# Patient Record
Sex: MALE | Race: BLACK OR AFRICAN AMERICAN | Employment: FULL TIME | URBAN - METROPOLITAN AREA
[De-identification: names, ages, dates, MRNs, and addresses within clinical notes are randomized per-mention and may not be internally consistent; named-entity substitution may affect disease eponyms.]

---

## 2019-11-05 ENCOUNTER — APPOINTMENT (OUTPATIENT)
Dept: MRI IMAGING | Age: 49
DRG: 182 | End: 2019-11-05
Attending: EMERGENCY MEDICINE
Payer: MEDICAID

## 2019-11-05 ENCOUNTER — APPOINTMENT (OUTPATIENT)
Dept: CT IMAGING | Age: 49
DRG: 182 | End: 2019-11-05
Attending: EMERGENCY MEDICINE
Payer: MEDICAID

## 2019-11-05 ENCOUNTER — HOSPITAL ENCOUNTER (INPATIENT)
Age: 49
LOS: 3 days | Discharge: HOME | DRG: 182 | End: 2019-11-09
Attending: EMERGENCY MEDICINE | Admitting: SURGERY
Payer: MEDICAID

## 2019-11-05 DIAGNOSIS — M62.82 NON-TRAUMATIC RHABDOMYOLYSIS: ICD-10-CM

## 2019-11-05 DIAGNOSIS — I77.1: ICD-10-CM

## 2019-11-05 DIAGNOSIS — I10 HYPERTENSION, ACCELERATED: ICD-10-CM

## 2019-11-05 DIAGNOSIS — G89.18 POST-OP PAIN: ICD-10-CM

## 2019-11-05 DIAGNOSIS — S44.92XA NEURAPRAXIA OF LEFT UPPER EXTREMITY, INITIAL ENCOUNTER: Primary | ICD-10-CM

## 2019-11-05 LAB
ALBUMIN SERPL-MCNC: 4 G/DL (ref 3.4–5)
ALBUMIN/GLOB SERPL: 1 {RATIO} (ref 0.8–1.7)
ALP SERPL-CCNC: 102 U/L (ref 45–117)
ALT SERPL-CCNC: 66 U/L (ref 16–61)
ANION GAP SERPL CALC-SCNC: 7 MMOL/L (ref 3–18)
AST SERPL-CCNC: 176 U/L (ref 10–38)
BASOPHILS # BLD: 0 K/UL (ref 0–0.1)
BASOPHILS NFR BLD: 0 % (ref 0–2)
BILIRUB SERPL-MCNC: 0.7 MG/DL (ref 0.2–1)
BUN SERPL-MCNC: 12 MG/DL (ref 7–18)
BUN/CREAT SERPL: 11 (ref 12–20)
CALCIUM SERPL-MCNC: 9.4 MG/DL (ref 8.5–10.1)
CHLORIDE SERPL-SCNC: 103 MMOL/L (ref 100–111)
CK MB CFR SERPL CALC: 2.1 % (ref 0–4)
CK MB SERPL-MCNC: 116.4 NG/ML (ref 5–25)
CK SERPL-CCNC: 5600 U/L (ref 39–308)
CO2 SERPL-SCNC: 29 MMOL/L (ref 21–32)
CREAT SERPL-MCNC: 1.07 MG/DL (ref 0.6–1.3)
DIFFERENTIAL METHOD BLD: ABNORMAL
EOSINOPHIL # BLD: 0 K/UL (ref 0–0.4)
EOSINOPHIL NFR BLD: 0 % (ref 0–5)
ERYTHROCYTE [DISTWIDTH] IN BLOOD BY AUTOMATED COUNT: 14.2 % (ref 11.6–14.5)
ETHANOL SERPL-MCNC: <3 MG/DL (ref 0–3)
GLOBULIN SER CALC-MCNC: 4.2 G/DL (ref 2–4)
GLUCOSE SERPL-MCNC: 113 MG/DL (ref 74–99)
HCT VFR BLD AUTO: 45 % (ref 36–48)
HGB BLD-MCNC: 15.1 G/DL (ref 13–16)
LYMPHOCYTES # BLD: 1.2 K/UL (ref 0.9–3.6)
LYMPHOCYTES NFR BLD: 12 % (ref 21–52)
MCH RBC QN AUTO: 31.7 PG (ref 24–34)
MCHC RBC AUTO-ENTMCNC: 33.6 G/DL (ref 31–37)
MCV RBC AUTO: 94.3 FL (ref 74–97)
MONOCYTES # BLD: 1 K/UL (ref 0.05–1.2)
MONOCYTES NFR BLD: 10 % (ref 3–10)
NEUTS SEG # BLD: 7.6 K/UL (ref 1.8–8)
NEUTS SEG NFR BLD: 78 % (ref 40–73)
PLATELET # BLD AUTO: 195 K/UL (ref 135–420)
PMV BLD AUTO: 10.1 FL (ref 9.2–11.8)
POTASSIUM SERPL-SCNC: 4 MMOL/L (ref 3.5–5.5)
PROT SERPL-MCNC: 8.2 G/DL (ref 6.4–8.2)
RBC # BLD AUTO: 4.77 M/UL (ref 4.7–5.5)
SODIUM SERPL-SCNC: 139 MMOL/L (ref 136–145)
TROPONIN I SERPL-MCNC: 0.85 NG/ML (ref 0–0.04)
WBC # BLD AUTO: 9.8 K/UL (ref 4.6–13.2)

## 2019-11-05 PROCEDURE — 93005 ELECTROCARDIOGRAM TRACING: CPT

## 2019-11-05 PROCEDURE — 85025 COMPLETE CBC W/AUTO DIFF WBC: CPT

## 2019-11-05 PROCEDURE — 99285 EMERGENCY DEPT VISIT HI MDM: CPT

## 2019-11-05 PROCEDURE — 74011250636 HC RX REV CODE- 250/636: Performed by: EMERGENCY MEDICINE

## 2019-11-05 PROCEDURE — 96374 THER/PROPH/DIAG INJ IV PUSH: CPT

## 2019-11-05 PROCEDURE — 74011636320 HC RX REV CODE- 636/320: Performed by: EMERGENCY MEDICINE

## 2019-11-05 PROCEDURE — 70450 CT HEAD/BRAIN W/O DYE: CPT

## 2019-11-05 PROCEDURE — 74011250637 HC RX REV CODE- 250/637: Performed by: EMERGENCY MEDICINE

## 2019-11-05 PROCEDURE — 82550 ASSAY OF CK (CPK): CPT

## 2019-11-05 PROCEDURE — 80053 COMPREHEN METABOLIC PANEL: CPT

## 2019-11-05 PROCEDURE — 85730 THROMBOPLASTIN TIME PARTIAL: CPT

## 2019-11-05 PROCEDURE — 80307 DRUG TEST PRSMV CHEM ANLYZR: CPT

## 2019-11-05 PROCEDURE — 74011000258 HC RX REV CODE- 258: Performed by: EMERGENCY MEDICINE

## 2019-11-05 PROCEDURE — 73206 CT ANGIO UPR EXTRM W/O&W/DYE: CPT

## 2019-11-05 RX ORDER — ALBUTEROL SULFATE 90 UG/1
2 AEROSOL, METERED RESPIRATORY (INHALATION)
COMMUNITY
End: 2020-06-30

## 2019-11-05 RX ORDER — SODIUM CHLORIDE 9 MG/ML
100 INJECTION, SOLUTION INTRAVENOUS ONCE
Status: COMPLETED | OUTPATIENT
Start: 2019-11-05 | End: 2019-11-05

## 2019-11-05 RX ORDER — HEPARIN SODIUM 1000 [USP'U]/ML
80 INJECTION, SOLUTION INTRAVENOUS; SUBCUTANEOUS ONCE
Status: COMPLETED | OUTPATIENT
Start: 2019-11-05 | End: 2019-11-05

## 2019-11-05 RX ORDER — GUAIFENESIN 100 MG/5ML
324 LIQUID (ML) ORAL
Status: COMPLETED | OUTPATIENT
Start: 2019-11-05 | End: 2019-11-05

## 2019-11-05 RX ORDER — HEPARIN SODIUM 10000 [USP'U]/100ML
18-36 INJECTION, SOLUTION INTRAVENOUS
Status: DISCONTINUED | OUTPATIENT
Start: 2019-11-05 | End: 2019-11-06

## 2019-11-05 RX ORDER — SODIUM CHLORIDE 9 MG/ML
150 INJECTION, SOLUTION INTRAVENOUS CONTINUOUS
Status: DISCONTINUED | OUTPATIENT
Start: 2019-11-05 | End: 2019-11-06

## 2019-11-05 RX ORDER — AMLODIPINE BESYLATE 5 MG/1
10 TABLET ORAL
Status: COMPLETED | OUTPATIENT
Start: 2019-11-05 | End: 2019-11-05

## 2019-11-05 RX ADMIN — NITROGLYCERIN 1 INCH: 20 OINTMENT TOPICAL at 21:23

## 2019-11-05 RX ADMIN — SODIUM CHLORIDE 1000 ML: 900 INJECTION, SOLUTION INTRAVENOUS at 19:20

## 2019-11-05 RX ADMIN — HEPARIN SODIUM 5440 UNITS: 1000 INJECTION INTRAVENOUS; SUBCUTANEOUS at 23:22

## 2019-11-05 RX ADMIN — IOPAMIDOL 80 ML: 755 INJECTION, SOLUTION INTRAVENOUS at 19:48

## 2019-11-05 RX ADMIN — ASPIRIN 81 MG 324 MG: 81 TABLET ORAL at 19:19

## 2019-11-05 RX ADMIN — AMLODIPINE BESYLATE 10 MG: 5 TABLET ORAL at 21:23

## 2019-11-05 RX ADMIN — SODIUM CHLORIDE 96 ML: 900 INJECTION, SOLUTION INTRAVENOUS at 19:49

## 2019-11-05 RX ADMIN — HEPARIN SODIUM 18 UNITS/KG/HR: 10000 INJECTION, SOLUTION INTRAVENOUS at 23:26

## 2019-11-05 NOTE — ED PROVIDER NOTES
Kavya Burns is a 52 y.o. Male with prior history of hypertension who states he woke up after falling asleep in the chair this morning around 10:00 with inability to move his left arm. Patient also has numbness and tingling that is mostly below the elbow. He is unable to  anything. Is able to bend his elbow. Patient can lift his shoulders. He denies any pain. He had no prior headache. There is no recent trauma. He had no neck pain. He is never had anything like this happen before. He came in because his symptoms are not getting better. Patient denies any visual changes, difficulty swallowing difficulty with speech difficulty with gait any chest pain or shortness of breath. The history is provided by the patient. No past medical history on file. No past surgical history on file. No family history on file. Social History     Socioeconomic History    Marital status: SINGLE     Spouse name: Not on file    Number of children: Not on file    Years of education: Not on file    Highest education level: Not on file   Occupational History    Not on file   Social Needs    Financial resource strain: Not on file    Food insecurity:     Worry: Not on file     Inability: Not on file    Transportation needs:     Medical: Not on file     Non-medical: Not on file   Tobacco Use    Smoking status: Current Every Day Smoker     Years: 20.00   Substance and Sexual Activity    Alcohol use:  Yes    Drug use: Not on file    Sexual activity: Not on file   Lifestyle    Physical activity:     Days per week: Not on file     Minutes per session: Not on file    Stress: Not on file   Relationships    Social connections:     Talks on phone: Not on file     Gets together: Not on file     Attends Sabianism service: Not on file     Active member of club or organization: Not on file     Attends meetings of clubs or organizations: Not on file     Relationship status: Not on file    Intimate partner violence:     Fear of current or ex partner: Not on file     Emotionally abused: Not on file     Physically abused: Not on file     Forced sexual activity: Not on file   Other Topics Concern    Not on file   Social History Narrative    Not on file         ALLERGIES: Shellfish derived    Review of Systems   Constitutional: Negative for fever. HENT: Negative for sore throat. Eyes: Negative for visual disturbance. Respiratory: Negative for shortness of breath. Cardiovascular: Negative for chest pain. Gastrointestinal: Negative for abdominal pain. Genitourinary: Negative for difficulty urinating. Musculoskeletal: Negative for gait problem, neck pain and neck stiffness. Skin: Negative for rash and wound. Neurological: Positive for weakness and numbness. Negative for dizziness, speech difficulty and headaches. Psychiatric/Behavioral: Negative for sleep disturbance. Vitals:    11/05/19 2123 11/05/19 2145 11/05/19 2200 11/05/19 2215   BP: (!) 184/130 (!) 168/122 (!) 176/119 (!) 170/116   Pulse: 87 77 78 77   Resp:  11 15 17   Temp:       SpO2:  100% 100% 100%   Weight:       Height:                Physical Exam   Constitutional: He is oriented to person, place, and time. He appears well-developed and well-nourished. Non-toxic appearance. He does not have a sickly appearance. He does not appear ill. No distress. HENT:   Head: Normocephalic and atraumatic. Right Ear: External ear normal.   Left Ear: External ear normal.   Nose: Nose normal.   Mouth/Throat: Oropharynx is clear and moist. No oropharyngeal exudate. Eyes: Conjunctivae are normal.   Neck: Normal range of motion. Cardiovascular: Normal rate, regular rhythm, normal heart sounds and intact distal pulses. Pulmonary/Chest: Effort normal and breath sounds normal. No respiratory distress. Abdominal: Soft. There is no tenderness. Musculoskeletal:   Unable to flex or extend at left elbow.   Also able to flex or extend left wrist. Unable to move fingers. Patient's left upper extremity is cool below the left elbow. His radial pulse is present. And his cap refill is significantly delayed. Neurological: He is alert and oriented to person, place, and time. A sensory deficit is present. No cranial nerve deficit (3-12). He exhibits abnormal muscle tone. GCS eye subscore is 4. GCS verbal subscore is 5. GCS motor subscore is 6. Patient appears to have normal tone of his left bicep and is able to lift his shoulder upwards and has normal tone and strength of his trapezial area. Patient is unable to flex or extend his elbow. He has complete wrist drop as well. He has decreased sensation below the elbow. Appears his sensation is intact above the elbow. There is normal distal pulses in his upper extremities and lower extremities. Skin: Skin is warm and dry. He is not diaphoretic. Psychiatric: His behavior is normal.   Nursing note and vitals reviewed.        MDM       Procedures  Vitals:  Patient Vitals for the past 12 hrs:   Temp Pulse Resp BP SpO2   11/05/19 2215  77 17 (!) 170/116 100 %   11/05/19 2200  78 15 (!) 176/119 100 %   11/05/19 2145  77 11 (!) 168/122 100 %   11/05/19 2123  87  (!) 184/130    11/05/19 2045  78 19 (!) 176/118 100 %   11/05/19 2030  82 18 (!) 175/126 100 %   11/05/19 2015  82 15 (!) 190/114 100 %   11/05/19 1915  85 17 (!) 174/116 100 %   11/05/19 1900  82 15  100 %   11/05/19 1845  89 19  100 %   11/05/19 1837    (!) 200/129    11/05/19 1833 98.3 °F (36.8 °C) 96  (!) 202/119 100 %   11/05/19 1830    (!) 186/124 100 %         Medications ordered:   Medications   0.9% sodium chloride infusion (has no administration in time range)   heparin 25,000 units in D5W 250 ml infusion (has no administration in time range)   sodium chloride 0.9 % bolus infusion 1,000 mL (0 mL IntraVENous IV Completed 11/5/19 2127)   aspirin chewable tablet 324 mg (324 mg Oral Given 11/5/19 1919)   iopamidol (ISOVUE-370) 76 % injection 100 mL (80 mL IntraVENous Given 11/5/19 1948)   0.9% sodium chloride infusion 100 mL (0 mL IntraVENous IV Completed 11/5/19 1950)   amLODIPine (NORVASC) tablet 10 mg (10 mg Oral Given 11/5/19 2123)   nitroglycerin (NITROBID) 2 % ointment 1 Inch (1 Inch Topical Given 11/5/19 2123)   heparin (porcine) 1,000 unit/mL injection 5,440 Units (5,440 Units IntraVENous Given 11/5/19 2322)         Lab findings:  Recent Results (from the past 12 hour(s))   CBC WITH AUTOMATED DIFF    Collection Time: 11/05/19  6:49 PM   Result Value Ref Range    WBC 9.8 4.6 - 13.2 K/uL    RBC 4.77 4.70 - 5.50 M/uL    HGB 15.1 13.0 - 16.0 g/dL    HCT 45.0 36.0 - 48.0 %    MCV 94.3 74.0 - 97.0 FL    MCH 31.7 24.0 - 34.0 PG    MCHC 33.6 31.0 - 37.0 g/dL    RDW 14.2 11.6 - 14.5 %    PLATELET 996 684 - 943 K/uL    MPV 10.1 9.2 - 11.8 FL    NEUTROPHILS 78 (H) 40 - 73 %    LYMPHOCYTES 12 (L) 21 - 52 %    MONOCYTES 10 3 - 10 %    EOSINOPHILS 0 0 - 5 %    BASOPHILS 0 0 - 2 %    ABS. NEUTROPHILS 7.6 1.8 - 8.0 K/UL    ABS. LYMPHOCYTES 1.2 0.9 - 3.6 K/UL    ABS. MONOCYTES 1.0 0.05 - 1.2 K/UL    ABS. EOSINOPHILS 0.0 0.0 - 0.4 K/UL    ABS. BASOPHILS 0.0 0.0 - 0.1 K/UL    DF AUTOMATED     METABOLIC PANEL, COMPREHENSIVE    Collection Time: 11/05/19  6:49 PM   Result Value Ref Range    Sodium 139 136 - 145 mmol/L    Potassium 4.0 3.5 - 5.5 mmol/L    Chloride 103 100 - 111 mmol/L    CO2 29 21 - 32 mmol/L    Anion gap 7 3.0 - 18 mmol/L    Glucose 113 (H) 74 - 99 mg/dL    BUN 12 7.0 - 18 MG/DL    Creatinine 1.07 0.6 - 1.3 MG/DL    BUN/Creatinine ratio 11 (L) 12 - 20      GFR est AA >60 >60 ml/min/1.73m2    GFR est non-AA >60 >60 ml/min/1.73m2    Calcium 9.4 8.5 - 10.1 MG/DL    Bilirubin, total 0.7 0.2 - 1.0 MG/DL    ALT (SGPT) 66 (H) 16 - 61 U/L    AST (SGOT) 176 (H) 10 - 38 U/L    Alk.  phosphatase 102 45 - 117 U/L    Protein, total 8.2 6.4 - 8.2 g/dL    Albumin 4.0 3.4 - 5.0 g/dL    Globulin 4.2 (H) 2.0 - 4.0 g/dL    A-G Ratio 1.0 0.8 - 1. 7     ETHYL ALCOHOL    Collection Time: 11/05/19  6:49 PM   Result Value Ref Range    ALCOHOL(ETHYL),SERUM <3 0 - 3 MG/DL   CARDIAC PANEL,(CK, CKMB & TROPONIN)    Collection Time: 11/05/19  6:49 PM   Result Value Ref Range    CK 5,600 (H) 39 - 308 U/L    CK - .4 (H) <3.6 ng/ml    CK-MB Index 2.1 0.0 - 4.0 %    Troponin-I, QT 0.85 (H) 0.0 - 0.045 NG/ML   EKG, 12 LEAD, INITIAL    Collection Time: 11/05/19 11:23 PM   Result Value Ref Range    Ventricular Rate 82 BPM    Atrial Rate 82 BPM    P-R Interval 180 ms    QRS Duration 90 ms    Q-T Interval 414 ms    QTC Calculation (Bezet) 483 ms    Calculated P Axis 30 degrees    Calculated R Axis 30 degrees    Calculated T Axis -175 degrees    Diagnosis       Normal sinus rhythm  Left ventricular hypertrophy with repolarization abnormality ( R in aVL ,   Eloy product , Romhilt-Augustin )  Possible Lateral infarct , age undetermined  Abnormal ECG  No previous ECGs available         EKG interpretation by ED Physician:  Normal sinus rhythm with LVH. No acute ST changes. There is T wave inversions. Rate 82 QRS 90   No previous EKG    Current monitor: Normal rate with regular rhythm and no ectopy  Pulse ox: 100% room air      X-Ray, CT or other radiology findings or impressions:  CT HEAD WO CONT    (Results Pending)   CTA UP EXT LT W CONT    (Results Pending)   MRI SHOULDER LT W WO CONT    (Results Pending)   CT head with no acute process per preliminary report  CTA upper extremity left: Patent but significantly diminished vascular flow distal to the elbow    Progress notes, Consult notes or additional Procedure notes:   Recheck of left upper extremity with still coolness noted but pulse present. Cap refill is delayed. Patient still unable to move elbow or distal joints.     10:01 PM  Discussed with Dr. Halina Rosenberg on-call for vascular who come evaluate the patient    10:41 PM  D/w dr Fabi Ashley on call for ortho who rec mri brachial plexus    D/w patient regarding plan for MRI tonight and that further delay in investigation of his symptoms could result in permanent disability and complete loss of function of his left arm. Patient had wanted to come back tomorrow but I told him that this is not recommended as he could lose permanent function    Patient was seen by Dr. Singh Led at bedside who recommends patient go to the OR for thrombectomy. Dr. Vickie Kern made aware patient was going to be admitted to go to the operating room and MRI would still be done tonight. ED Critical Care Note    System at risk for life threatening failure: Cardiac, pulmonary, renal  Associated problems: Rhabdo, elevated troponin, arterial occlusion    Critical Care services provided: Bedside management of neuropraxia, arterial occlusion, rhabdo, documentation, consultation, bedside reassessment  Excluded procedures (time not included in critical care): EKG interpretation    Total Critical Care Time (in minutes) 94            Reevaluation of patient:   stable    Disposition:  Diagnosis:   1. Neurapraxia of left upper extremity, initial encounter    2. Extrinsic arterial compression (HCC)    3. Hypertension, accelerated    4. Non-traumatic rhabdomyolysis        Disposition: admit    Follow-up Information    None           Patient's Medications   Start Taking    No medications on file   Continue Taking    ALBUTEROL (PROVENTIL HFA, VENTOLIN HFA, PROAIR HFA) 90 MCG/ACTUATION INHALER    Take 2 Puffs by inhalation every four (4) hours as needed for Wheezing.     TRAMADOL (ULTRAM) 50 MG TABLET    Sig: Take 1-2 tablets every 6 hours PRN severe pain   These Medications have changed    No medications on file   Stop Taking    No medications on file

## 2019-11-05 NOTE — ED NOTES
Charge nurse phoned prior to rolling patient to back for assessment. Physician requested patient be taken to room 4. Last seen alert, verbal, and without pain. Significant other present at bedside. No vitals were done as nursing assessment was found to be in need of physician assessment.

## 2019-11-05 NOTE — ED TRIAGE NOTES
Pt presents with c/o left arm numbness/ weakness since this AM. Did fall off couch during sleep last night, but denies injury.

## 2019-11-05 NOTE — ED TRIAGE NOTES
Patient stated \"I can't move my left arm since I woke up at 1000 this morning. I got up from the floor this morning. I can't move the fingers or anything\". Arm is unable to be controlled by patient. Fell to leg when lifted by this nurse. Wheeled to back for physician assessment. Patient is ambulatory and has control of bilateral lower extremities and right upper extremity.

## 2019-11-06 ENCOUNTER — ANESTHESIA (OUTPATIENT)
Dept: SURGERY | Age: 49
DRG: 182 | End: 2019-11-06
Payer: MEDICAID

## 2019-11-06 ENCOUNTER — ANESTHESIA EVENT (OUTPATIENT)
Dept: SURGERY | Age: 49
DRG: 182 | End: 2019-11-06
Payer: MEDICAID

## 2019-11-06 ENCOUNTER — APPOINTMENT (OUTPATIENT)
Dept: GENERAL RADIOLOGY | Age: 49
DRG: 182 | End: 2019-11-06
Attending: SURGERY
Payer: MEDICAID

## 2019-11-06 ENCOUNTER — APPOINTMENT (OUTPATIENT)
Dept: MRI IMAGING | Age: 49
DRG: 182 | End: 2019-11-06
Attending: SURGERY
Payer: MEDICAID

## 2019-11-06 ENCOUNTER — APPOINTMENT (OUTPATIENT)
Dept: NON INVASIVE DIAGNOSTICS | Age: 49
DRG: 182 | End: 2019-11-06
Attending: INTERNAL MEDICINE
Payer: MEDICAID

## 2019-11-06 PROBLEM — M79.602 PAIN OF LEFT UPPER EXTREMITY DUE TO ISCHEMIA: Status: ACTIVE | Noted: 2019-11-06

## 2019-11-06 PROBLEM — I70.228 CRITICAL ISCHEMIA OF UPPER EXTREMITY (HCC): Status: ACTIVE | Noted: 2019-11-06

## 2019-11-06 PROBLEM — I99.8 PAIN OF LEFT UPPER EXTREMITY DUE TO ISCHEMIA: Status: ACTIVE | Noted: 2019-11-06

## 2019-11-06 LAB
AMPHET UR QL SCN: NEGATIVE
ANION GAP SERPL CALC-SCNC: 6 MMOL/L (ref 3–18)
APTT PPP: 24.5 SEC (ref 23–36.4)
BARBITURATES UR QL SCN: NEGATIVE
BENZODIAZ UR QL: POSITIVE
BUN SERPL-MCNC: 9 MG/DL (ref 7–18)
BUN/CREAT SERPL: 12 (ref 12–20)
CALCIUM SERPL-MCNC: 9.2 MG/DL (ref 8.5–10.1)
CANNABINOIDS UR QL SCN: POSITIVE
CHLORIDE SERPL-SCNC: 106 MMOL/L (ref 100–111)
CK MB CFR SERPL CALC: 1.9 % (ref 0–4)
CK MB SERPL-MCNC: 94.1 NG/ML (ref 5–25)
CK SERPL-CCNC: 4973 U/L (ref 39–308)
CO2 SERPL-SCNC: 26 MMOL/L (ref 21–32)
COCAINE UR QL SCN: POSITIVE
CREAT SERPL-MCNC: 0.78 MG/DL (ref 0.6–1.3)
ECHO AO ASC DIAM: 3.24 CM
ECHO AO ROOT DIAM: 3.98 CM
ECHO IVC SNIFF: 1.29 CM
ECHO LA MAJOR AXIS: 2.25 CM
ECHO LA TO AORTIC ROOT RATIO: 0.57
ECHO LV EDV TEICHHOLZ: 0.41 ML
ECHO LV ESV TEICHHOLZ: 0.13 ML
ECHO LV INTERNAL DIMENSION DIASTOLIC: 4.08 CM (ref 4.2–5.9)
ECHO LV INTERNAL DIMENSION SYSTOLIC: 2.56 CM
ECHO LV IVSD: 1.26 CM (ref 0.6–1)
ECHO LV MASS 2D: 187.1 G (ref 88–224)
ECHO LV MASS INDEX 2D: 96.3 G/M2 (ref 49–115)
ECHO LV POSTERIOR WALL DIASTOLIC: 1.05 CM (ref 0.6–1)
ECHO LVOT DIAM: 2.24 CM
ECHO LVOT PEAK GRADIENT: 4.6 MMHG
ECHO LVOT PEAK VELOCITY: 107.59 CM/S
ECHO LVOT SV: 66.3 ML
ECHO LVOT VTI: 16.9 CM
ECHO MV A VELOCITY: 62.34 CM/S
ECHO MV E DECELERATION TIME (DT): 161.4 MS
ECHO MV E VELOCITY: 32.5 CM/S
ECHO MV E/A RATIO: 0.52
ECHO RA MINOR AXIS: 2.31 CM
ERYTHROCYTE [DISTWIDTH] IN BLOOD BY AUTOMATED COUNT: 14 % (ref 11.6–14.5)
GLUCOSE SERPL-MCNC: 91 MG/DL (ref 74–99)
HCT VFR BLD AUTO: 45 % (ref 36–48)
HDSCOM,HDSCOM: ABNORMAL
HGB BLD-MCNC: 15 G/DL (ref 13–16)
LVFS 2D: 37.07 %
LVOT MG: 2.49 MMHG
LVOT MV: 0.74 CM/S
LVSV (MOD SINGLE 4C): 41.82 ML
LVSV (TEICH): 25.51 ML
MCH RBC QN AUTO: 31.3 PG (ref 24–34)
MCHC RBC AUTO-ENTMCNC: 33.3 G/DL (ref 31–37)
MCV RBC AUTO: 93.8 FL (ref 74–97)
METHADONE UR QL: NEGATIVE
MV DEC SLOPE: 2.01
OPIATES UR QL: NEGATIVE
PCP UR QL: NEGATIVE
PLATELET # BLD AUTO: 210 K/UL (ref 135–420)
PMV BLD AUTO: 11.1 FL (ref 9.2–11.8)
POTASSIUM SERPL-SCNC: 4.4 MMOL/L (ref 3.5–5.5)
RBC # BLD AUTO: 4.8 M/UL (ref 4.7–5.5)
SODIUM SERPL-SCNC: 138 MMOL/L (ref 136–145)
TROPONIN I SERPL-MCNC: 1.1 NG/ML (ref 0–0.04)
TSH SERPL DL<=0.05 MIU/L-ACNC: 0.59 UIU/ML (ref 0.36–3.74)
WBC # BLD AUTO: 7.1 K/UL (ref 4.6–13.2)

## 2019-11-06 PROCEDURE — B31J1ZZ FLUOROSCOPY OF LEFT UPPER EXTREMITY ARTERIES USING LOW OSMOLAR CONTRAST: ICD-10-PCS | Performed by: SURGERY

## 2019-11-06 PROCEDURE — 77030002996 HC SUT SLK J&J -A: Performed by: SURGERY

## 2019-11-06 PROCEDURE — 76210000063 HC OR PH I REC FIRST 0.5 HR: Performed by: SURGERY

## 2019-11-06 PROCEDURE — 74011250636 HC RX REV CODE- 250/636: Performed by: SURGERY

## 2019-11-06 PROCEDURE — 74011636320 HC RX REV CODE- 636/320: Performed by: SURGERY

## 2019-11-06 PROCEDURE — 77030002987 HC SUT PROL J&J -B: Performed by: SURGERY

## 2019-11-06 PROCEDURE — 77030002933 HC SUT MCRYL J&J -A: Performed by: SURGERY

## 2019-11-06 PROCEDURE — 93005 ELECTROCARDIOGRAM TRACING: CPT

## 2019-11-06 PROCEDURE — 74011250637 HC RX REV CODE- 250/637: Performed by: SURGERY

## 2019-11-06 PROCEDURE — 76060000034 HC ANESTHESIA 1.5 TO 2 HR: Performed by: SURGERY

## 2019-11-06 PROCEDURE — 85027 COMPLETE CBC AUTOMATED: CPT

## 2019-11-06 PROCEDURE — 03CC0ZZ EXTIRPATION OF MATTER FROM LEFT RADIAL ARTERY, OPEN APPROACH: ICD-10-PCS | Performed by: SURGERY

## 2019-11-06 PROCEDURE — 77030003390 HC NDL ANGI MRTM -A: Performed by: SURGERY

## 2019-11-06 PROCEDURE — 74011250637 HC RX REV CODE- 250/637: Performed by: INTERNAL MEDICINE

## 2019-11-06 PROCEDURE — 77030002986 HC SUT PROL J&J -A: Performed by: SURGERY

## 2019-11-06 PROCEDURE — 77030021352 HC CBL LD SYS DISP COVD -B

## 2019-11-06 PROCEDURE — 84443 ASSAY THYROID STIM HORMONE: CPT

## 2019-11-06 PROCEDURE — 77030008683 HC TU ET CUF COVD -A: Performed by: ANESTHESIOLOGY

## 2019-11-06 PROCEDURE — 77030031139 HC SUT VCRL2 J&J -A: Performed by: SURGERY

## 2019-11-06 PROCEDURE — 74011000250 HC RX REV CODE- 250: Performed by: NURSE ANESTHETIST, CERTIFIED REGISTERED

## 2019-11-06 PROCEDURE — 74011000272 HC RX REV CODE- 272: Performed by: SURGERY

## 2019-11-06 PROCEDURE — 74011250636 HC RX REV CODE- 250/636: Performed by: NURSE ANESTHETIST, CERTIFIED REGISTERED

## 2019-11-06 PROCEDURE — 80307 DRUG TEST PRSMV CHEM ANLYZR: CPT

## 2019-11-06 PROCEDURE — 77001 FLUOROGUIDE FOR VEIN DEVICE: CPT

## 2019-11-06 PROCEDURE — 65270000029 HC RM PRIVATE

## 2019-11-06 PROCEDURE — 77030008477 HC STYL SATN SLP COVD -A: Performed by: ANESTHESIOLOGY

## 2019-11-06 PROCEDURE — 03C80ZZ EXTIRPATION OF MATTER FROM LEFT BRACHIAL ARTERY, OPEN APPROACH: ICD-10-PCS | Performed by: SURGERY

## 2019-11-06 PROCEDURE — C8929 TTE W OR WO FOL WCON,DOPPLER: HCPCS

## 2019-11-06 PROCEDURE — C1757 CATH, THROMBECTOMY/EMBOLECT: HCPCS | Performed by: SURGERY

## 2019-11-06 PROCEDURE — 74011000258 HC RX REV CODE- 258: Performed by: INTERNAL MEDICINE

## 2019-11-06 PROCEDURE — C1894 INTRO/SHEATH, NON-LASER: HCPCS | Performed by: SURGERY

## 2019-11-06 PROCEDURE — 74011000250 HC RX REV CODE- 250: Performed by: SURGERY

## 2019-11-06 PROCEDURE — 77030019908 HC STETH ESOPH SIMS -A: Performed by: ANESTHESIOLOGY

## 2019-11-06 PROCEDURE — 77030010514 HC APPL CLP LIG COVD -B: Performed by: SURGERY

## 2019-11-06 PROCEDURE — 80048 BASIC METABOLIC PNL TOTAL CA: CPT

## 2019-11-06 PROCEDURE — 76010000153 HC OR TIME 1.5 TO 2 HR: Performed by: SURGERY

## 2019-11-06 PROCEDURE — 77030018836 HC SOL IRR NACL ICUM -A: Performed by: SURGERY

## 2019-11-06 PROCEDURE — 03CA0ZZ EXTIRPATION OF MATTER FROM LEFT ULNAR ARTERY, OPEN APPROACH: ICD-10-PCS | Performed by: SURGERY

## 2019-11-06 PROCEDURE — 74011250636 HC RX REV CODE- 250/636: Performed by: INTERNAL MEDICINE

## 2019-11-06 PROCEDURE — 36415 COLL VENOUS BLD VENIPUNCTURE: CPT

## 2019-11-06 RX ORDER — ACETAMINOPHEN 325 MG/1
650 TABLET ORAL
Status: DISCONTINUED | OUTPATIENT
Start: 2019-11-06 | End: 2019-11-09 | Stop reason: HOSPADM

## 2019-11-06 RX ORDER — CEFAZOLIN SODIUM 1 G/3ML
INJECTION, POWDER, FOR SOLUTION INTRAMUSCULAR; INTRAVENOUS AS NEEDED
Status: DISCONTINUED | OUTPATIENT
Start: 2019-11-06 | End: 2019-11-06 | Stop reason: HOSPADM

## 2019-11-06 RX ORDER — OXYCODONE AND ACETAMINOPHEN 5; 325 MG/1; MG/1
1 TABLET ORAL
Status: DISCONTINUED | OUTPATIENT
Start: 2019-11-06 | End: 2019-11-09 | Stop reason: HOSPADM

## 2019-11-06 RX ORDER — AMLODIPINE BESYLATE 5 MG/1
10 TABLET ORAL DAILY
Status: DISCONTINUED | OUTPATIENT
Start: 2019-11-06 | End: 2019-11-09 | Stop reason: HOSPADM

## 2019-11-06 RX ORDER — MIDAZOLAM HYDROCHLORIDE 1 MG/ML
INJECTION, SOLUTION INTRAMUSCULAR; INTRAVENOUS AS NEEDED
Status: DISCONTINUED | OUTPATIENT
Start: 2019-11-06 | End: 2019-11-06 | Stop reason: HOSPADM

## 2019-11-06 RX ORDER — SODIUM CHLORIDE 0.9 % (FLUSH) 0.9 %
5-40 SYRINGE (ML) INJECTION AS NEEDED
Status: DISCONTINUED | OUTPATIENT
Start: 2019-11-06 | End: 2019-11-06 | Stop reason: HOSPADM

## 2019-11-06 RX ORDER — SODIUM CHLORIDE 0.9 % (FLUSH) 0.9 %
5-40 SYRINGE (ML) INJECTION EVERY 8 HOURS
Status: DISCONTINUED | OUTPATIENT
Start: 2019-11-06 | End: 2019-11-09 | Stop reason: HOSPADM

## 2019-11-06 RX ORDER — SODIUM CHLORIDE 0.9 % (FLUSH) 0.9 %
5-40 SYRINGE (ML) INJECTION EVERY 8 HOURS
Status: DISCONTINUED | OUTPATIENT
Start: 2019-11-06 | End: 2019-11-06 | Stop reason: HOSPADM

## 2019-11-06 RX ORDER — ONDANSETRON 2 MG/ML
INJECTION INTRAMUSCULAR; INTRAVENOUS AS NEEDED
Status: DISCONTINUED | OUTPATIENT
Start: 2019-11-06 | End: 2019-11-06 | Stop reason: HOSPADM

## 2019-11-06 RX ORDER — ONDANSETRON 2 MG/ML
4 INJECTION INTRAMUSCULAR; INTRAVENOUS
Status: DISCONTINUED | OUTPATIENT
Start: 2019-11-06 | End: 2019-11-09 | Stop reason: HOSPADM

## 2019-11-06 RX ORDER — LORAZEPAM 1 MG/1
2 TABLET ORAL
Status: DISCONTINUED | OUTPATIENT
Start: 2019-11-06 | End: 2019-11-09 | Stop reason: HOSPADM

## 2019-11-06 RX ORDER — SODIUM CHLORIDE 0.9 % (FLUSH) 0.9 %
5-40 SYRINGE (ML) INJECTION AS NEEDED
Status: DISCONTINUED | OUTPATIENT
Start: 2019-11-06 | End: 2019-11-09 | Stop reason: HOSPADM

## 2019-11-06 RX ORDER — DOCUSATE SODIUM 100 MG/1
100 CAPSULE, LIQUID FILLED ORAL 2 TIMES DAILY
Status: DISCONTINUED | OUTPATIENT
Start: 2019-11-06 | End: 2019-11-09 | Stop reason: HOSPADM

## 2019-11-06 RX ORDER — HEPARIN SODIUM 5000 [USP'U]/ML
5000 INJECTION, SOLUTION INTRAVENOUS; SUBCUTANEOUS EVERY 8 HOURS
Status: DISCONTINUED | OUTPATIENT
Start: 2019-11-06 | End: 2019-11-09 | Stop reason: HOSPADM

## 2019-11-06 RX ORDER — SODIUM CHLORIDE, SODIUM LACTATE, POTASSIUM CHLORIDE, CALCIUM CHLORIDE 600; 310; 30; 20 MG/100ML; MG/100ML; MG/100ML; MG/100ML
INJECTION, SOLUTION INTRAVENOUS
Status: DISCONTINUED | OUTPATIENT
Start: 2019-11-06 | End: 2019-11-06 | Stop reason: HOSPADM

## 2019-11-06 RX ORDER — CLOPIDOGREL BISULFATE 75 MG/1
75 TABLET ORAL DAILY
Status: DISCONTINUED | OUTPATIENT
Start: 2019-11-06 | End: 2019-11-09 | Stop reason: HOSPADM

## 2019-11-06 RX ORDER — SODIUM CHLORIDE, SODIUM LACTATE, POTASSIUM CHLORIDE, CALCIUM CHLORIDE 600; 310; 30; 20 MG/100ML; MG/100ML; MG/100ML; MG/100ML
50 INJECTION, SOLUTION INTRAVENOUS CONTINUOUS
Status: DISCONTINUED | OUTPATIENT
Start: 2019-11-06 | End: 2019-11-06 | Stop reason: HOSPADM

## 2019-11-06 RX ORDER — HYDRALAZINE HYDROCHLORIDE 20 MG/ML
INJECTION INTRAMUSCULAR; INTRAVENOUS AS NEEDED
Status: DISCONTINUED | OUTPATIENT
Start: 2019-11-06 | End: 2019-11-06 | Stop reason: HOSPADM

## 2019-11-06 RX ORDER — LORAZEPAM 2 MG/ML
2 INJECTION INTRAMUSCULAR
Status: DISCONTINUED | OUTPATIENT
Start: 2019-11-06 | End: 2019-11-09 | Stop reason: HOSPADM

## 2019-11-06 RX ORDER — LORAZEPAM 1 MG/1
1 TABLET ORAL
Status: DISCONTINUED | OUTPATIENT
Start: 2019-11-06 | End: 2019-11-09 | Stop reason: HOSPADM

## 2019-11-06 RX ORDER — MORPHINE SULFATE 2 MG/ML
2 INJECTION, SOLUTION INTRAMUSCULAR; INTRAVENOUS
Status: DISCONTINUED | OUTPATIENT
Start: 2019-11-06 | End: 2019-11-09 | Stop reason: HOSPADM

## 2019-11-06 RX ORDER — LORAZEPAM 2 MG/ML
3 INJECTION INTRAMUSCULAR
Status: DISCONTINUED | OUTPATIENT
Start: 2019-11-06 | End: 2019-11-09 | Stop reason: HOSPADM

## 2019-11-06 RX ORDER — ESMOLOL HYDROCHLORIDE 10 MG/ML
INJECTION INTRAVENOUS AS NEEDED
Status: DISCONTINUED | OUTPATIENT
Start: 2019-11-06 | End: 2019-11-06 | Stop reason: HOSPADM

## 2019-11-06 RX ORDER — IBUPROFEN 200 MG
1 TABLET ORAL DAILY
Status: DISCONTINUED | OUTPATIENT
Start: 2019-11-07 | End: 2019-11-09 | Stop reason: HOSPADM

## 2019-11-06 RX ORDER — HYDROMORPHONE HYDROCHLORIDE 1 MG/ML
0.5 INJECTION, SOLUTION INTRAMUSCULAR; INTRAVENOUS; SUBCUTANEOUS AS NEEDED
Status: DISCONTINUED | OUTPATIENT
Start: 2019-11-06 | End: 2019-11-06 | Stop reason: HOSPADM

## 2019-11-06 RX ORDER — FENTANYL CITRATE 50 UG/ML
INJECTION, SOLUTION INTRAMUSCULAR; INTRAVENOUS AS NEEDED
Status: DISCONTINUED | OUTPATIENT
Start: 2019-11-06 | End: 2019-11-06 | Stop reason: HOSPADM

## 2019-11-06 RX ORDER — DIPHENHYDRAMINE HYDROCHLORIDE 50 MG/ML
12.5 INJECTION, SOLUTION INTRAMUSCULAR; INTRAVENOUS
Status: DISCONTINUED | OUTPATIENT
Start: 2019-11-06 | End: 2019-11-09 | Stop reason: HOSPADM

## 2019-11-06 RX ORDER — LIDOCAINE HYDROCHLORIDE 20 MG/ML
INJECTION, SOLUTION EPIDURAL; INFILTRATION; INTRACAUDAL; PERINEURAL AS NEEDED
Status: DISCONTINUED | OUTPATIENT
Start: 2019-11-06 | End: 2019-11-06 | Stop reason: HOSPADM

## 2019-11-06 RX ORDER — DEXTROSE, SODIUM CHLORIDE, AND POTASSIUM CHLORIDE 5; .45; .075 G/100ML; G/100ML; G/100ML
125 INJECTION INTRAVENOUS CONTINUOUS
Status: DISCONTINUED | OUTPATIENT
Start: 2019-11-06 | End: 2019-11-09

## 2019-11-06 RX ORDER — LORAZEPAM 2 MG/ML
1 INJECTION INTRAMUSCULAR
Status: DISCONTINUED | OUTPATIENT
Start: 2019-11-06 | End: 2019-11-09 | Stop reason: HOSPADM

## 2019-11-06 RX ORDER — ALBUTEROL SULFATE 90 UG/1
2 AEROSOL, METERED RESPIRATORY (INHALATION)
Status: DISCONTINUED | OUTPATIENT
Start: 2019-11-06 | End: 2019-11-06 | Stop reason: CLARIF

## 2019-11-06 RX ORDER — PROPOFOL 10 MG/ML
INJECTION, EMULSION INTRAVENOUS AS NEEDED
Status: DISCONTINUED | OUTPATIENT
Start: 2019-11-06 | End: 2019-11-06 | Stop reason: HOSPADM

## 2019-11-06 RX ORDER — ALBUTEROL SULFATE 0.83 MG/ML
2.5 SOLUTION RESPIRATORY (INHALATION)
Status: DISCONTINUED | OUTPATIENT
Start: 2019-11-06 | End: 2019-11-09 | Stop reason: HOSPADM

## 2019-11-06 RX ORDER — NALOXONE HYDROCHLORIDE 0.4 MG/ML
0.4 INJECTION, SOLUTION INTRAMUSCULAR; INTRAVENOUS; SUBCUTANEOUS AS NEEDED
Status: DISCONTINUED | OUTPATIENT
Start: 2019-11-06 | End: 2019-11-09 | Stop reason: HOSPADM

## 2019-11-06 RX ORDER — ROCURONIUM BROMIDE 10 MG/ML
INJECTION, SOLUTION INTRAVENOUS AS NEEDED
Status: DISCONTINUED | OUTPATIENT
Start: 2019-11-06 | End: 2019-11-06 | Stop reason: HOSPADM

## 2019-11-06 RX ORDER — ONDANSETRON 2 MG/ML
4 INJECTION INTRAMUSCULAR; INTRAVENOUS ONCE
Status: DISCONTINUED | OUTPATIENT
Start: 2019-11-06 | End: 2019-11-06 | Stop reason: HOSPADM

## 2019-11-06 RX ORDER — ASPIRIN 81 MG/1
81 TABLET ORAL DAILY
Status: DISCONTINUED | OUTPATIENT
Start: 2019-11-06 | End: 2019-11-09 | Stop reason: HOSPADM

## 2019-11-06 RX ADMIN — DEXTROSE MONOHYDRATE, SODIUM CHLORIDE, AND POTASSIUM CHLORIDE 125 ML/HR: 50; 4.5; .745 INJECTION, SOLUTION INTRAVENOUS at 20:39

## 2019-11-06 RX ADMIN — FENTANYL CITRATE 100 MCG: 50 INJECTION, SOLUTION INTRAMUSCULAR; INTRAVENOUS at 00:48

## 2019-11-06 RX ADMIN — HEPARIN SODIUM 5000 UNITS: 5000 INJECTION INTRAVENOUS; SUBCUTANEOUS at 12:48

## 2019-11-06 RX ADMIN — CLOPIDOGREL BISULFATE 75 MG: 75 TABLET ORAL at 11:09

## 2019-11-06 RX ADMIN — Medication 10 ML: at 11:10

## 2019-11-06 RX ADMIN — LIDOCAINE HYDROCHLORIDE 80 MG: 20 INJECTION, SOLUTION INTRAVENOUS at 00:48

## 2019-11-06 RX ADMIN — ESMOLOL HYDROCHLORIDE 30 MG: 10 INJECTION INTRAVENOUS at 00:51

## 2019-11-06 RX ADMIN — ROCURONIUM BROMIDE 6 MG: 10 SOLUTION INTRAVENOUS at 00:50

## 2019-11-06 RX ADMIN — ASPIRIN 81 MG: 81 TABLET, COATED ORAL at 11:09

## 2019-11-06 RX ADMIN — PROPOFOL 200 MG: 10 INJECTION, EMULSION INTRAVENOUS at 00:48

## 2019-11-06 RX ADMIN — HYDRALAZINE HYDROCHLORIDE 10 MG: 20 INJECTION INTRAMUSCULAR; INTRAVENOUS at 02:35

## 2019-11-06 RX ADMIN — DOCUSATE SODIUM 100 MG: 100 CAPSULE, LIQUID FILLED ORAL at 18:30

## 2019-11-06 RX ADMIN — Medication 10 ML: at 18:31

## 2019-11-06 RX ADMIN — THIAMINE HYDROCHLORIDE 100 MG: 100 INJECTION, SOLUTION INTRAMUSCULAR; INTRAVENOUS at 20:39

## 2019-11-06 RX ADMIN — ROCURONIUM BROMIDE 1 MG: 10 SOLUTION INTRAVENOUS at 00:48

## 2019-11-06 RX ADMIN — SODIUM CHLORIDE, SODIUM LACTATE, POTASSIUM CHLORIDE, AND CALCIUM CHLORIDE: 600; 310; 30; 20 INJECTION, SOLUTION INTRAVENOUS at 00:41

## 2019-11-06 RX ADMIN — CEFAZOLIN 2 G: 1 INJECTION, POWDER, FOR SOLUTION INTRAVENOUS at 01:08

## 2019-11-06 RX ADMIN — PERFLUTREN 1 ML: 6.52 INJECTION, SUSPENSION INTRAVENOUS at 10:24

## 2019-11-06 RX ADMIN — SUGAMMADEX 200 MG: 100 INJECTION, SOLUTION INTRAVENOUS at 02:05

## 2019-11-06 RX ADMIN — ONDANSETRON 4 MG: 2 SOLUTION INTRAMUSCULAR; INTRAVENOUS at 01:55

## 2019-11-06 RX ADMIN — DOCUSATE SODIUM 100 MG: 100 CAPSULE, LIQUID FILLED ORAL at 11:09

## 2019-11-06 RX ADMIN — Medication 10 ML: at 23:39

## 2019-11-06 RX ADMIN — HEPARIN SODIUM 6 ML: 1000 INJECTION, SOLUTION INTRAVENOUS; SUBCUTANEOUS at 01:21

## 2019-11-06 RX ADMIN — HEPARIN SODIUM 5000 UNITS: 5000 INJECTION INTRAVENOUS; SUBCUTANEOUS at 04:38

## 2019-11-06 RX ADMIN — HYDRALAZINE HYDROCHLORIDE 10 MG: 20 INJECTION INTRAMUSCULAR; INTRAVENOUS at 02:14

## 2019-11-06 RX ADMIN — HEPARIN SODIUM 5000 UNITS: 5000 INJECTION INTRAVENOUS; SUBCUTANEOUS at 20:38

## 2019-11-06 RX ADMIN — MIDAZOLAM 2 MG: 1 INJECTION INTRAMUSCULAR; INTRAVENOUS at 00:42

## 2019-11-06 RX ADMIN — AMLODIPINE BESYLATE 10 MG: 10 TABLET ORAL at 12:49

## 2019-11-06 NOTE — ED NOTES
Pt presetns with c/o left arm weakness/ tingling since this AM. Was arose this AM. Pt states unable to move arm at all. Some swelling noted in left hand. Both hands cool to touch, but cap refill =3secs. Strong radial pulses palpated bilaterally. Has sensation in left arm, but dull to lower arm. He does report falling off the couch in his sleep last night, but denies any injury.

## 2019-11-06 NOTE — PROGRESS NOTES
Problem: Falls - Risk of  Goal: *Absence of Falls  Description  Document Vickii Bridges Fall Risk and appropriate interventions in the flowsheet. Outcome: Progressing Towards Goal  Note:   Fall Risk Interventions:            Medication Interventions: Evaluate medications/consider consulting pharmacy, Patient to call before getting OOB    Elimination Interventions: Call light in reach, Urinal in reach              Problem: Patient Education: Go to Patient Education Activity  Goal: Patient/Family Education  Outcome: Progressing Towards Goal     Problem: Pressure Injury - Risk of  Goal: *Prevention of pressure injury  Description  Document Chinedu Scale and appropriate interventions in the flowsheet.   Outcome: Progressing Towards Goal  Note:   Pressure Injury Interventions:  Sensory Interventions: Check visual cues for pain         Activity Interventions: Pressure redistribution bed/mattress(bed type)    Mobility Interventions: HOB 30 degrees or less, Pressure redistribution bed/mattress (bed type)    Nutrition Interventions: Document food/fluid/supplement intake                     Problem: Patient Education: Go to Patient Education Activity  Goal: Patient/Family Education  Outcome: Progressing Towards Goal     Problem: Pain  Goal: *Control of Pain  Outcome: Progressing Towards Goal  Goal: *PALLIATIVE CARE:  Alleviation of Pain  Outcome: Progressing Towards Goal     Problem: Tissue Perfusion - Cardiopulmonary, Altered  Goal: *Optimize tissue perfusion  Outcome: Progressing Towards Goal  Goal: *Absence of hypoxia  Outcome: Progressing Towards Goal

## 2019-11-06 NOTE — OP NOTES
[facility]  OPERATIVE REPORT    Name:  Kiet Teixeira  MR#:   [id number]  :  1970  ACCOUNT #:  [de-identified]  DATE OF SERVICE:  2019    PREOPERATIVE DIAGNOSIS:  Acute ischemia to left upper extremity. POSTOPERATIVE DIAGNOSIS:  Acute ischemia to left upper extremity. PROCEDURE PERFORMED:  1.  Embolectomy of left brachial artery, left radial artery, left ulnar artery. 2.  Left upper extremity angiogram.    SURGEON:  Jorge Estrada MD    ASSISTANT:  None. ANESTHESIA:  General.    COMPLICATIONS:  None. CULTURES:  None. SPECIMENS REMOVED:  None. DRAINS:  None. IMPLANTS:  None. ESTIMATED BLOOD LOSS:  150 mL. INDICATIONS FOR THE PROCEDURE:  The patient is a 22-year-old gentleman who has signs and symptoms of acute limb ischemia, was found on CT scan to have appearance of a target sign and on ultrasound, appearance of short segment occlusion of distal brachial artery. The patient was recommended for thrombectomy. The patient was given the risks and benefits of procedure, including but not limited to bleeding, infection, damage to adjacent structures, MI, stroke, and death as well as loss of upper extremity, loss of function, and useless arm, and need for further surgery. The patient was understanding of all the risks and wants the procedure. OPERATIVE FINDINGS:  No large thrombus was removed, although on thrombectomy, we went from poor flow to massive inflow as well as poor flow distally and massive backbleeding on Jonathan embolectomy, but no major thrombus was seen or removed, although this could have been sucked up in suction.   Angiogram after thrombectomy shows completely patent brachial artery without any evidence of hemodynamically significant stenosis as well as the same to the left radial and ulnar arteries with an appearance of an intact palmar arch with equal flow going down the radial and ulnar arteries and now easily palpable radial and ulnar pulses at the wrist.    PROCEDURE:  The patient was correctly identified in the preoperative holding area, taken to the operating room in stable condition. The patient had a pre-incision time-out per Anesthesia. The patient was prepped and draped in normal sterile fashion according to CDC guidelines aseptic technique. The patient also had preoperative antibiotics prior to incision. We then were able to make a transverse incision just below the antecubital fossa, dissected down to the brachial artery. We looped this with blue vessel loop in a Jose fashion proximally and then went down to the radial and ulnar arteries and looped this with blue vessel loops in a Jose fashion. We heparinized the patient appropriately, created a transverse incision using an #11 blade and Jose scissors. We had some poor inflow. This was easily improved with a 4-Japanese Jonathan, unable to identify any major clot or portion that was brought back, but upon doing this, we had great inflow. We did do this two more times and made sure that we had a clean inflow. We then were able to allow for some backbleeding on the radial and ulnar, just slow, sluggish backbleeding. On the ulnar side, after 4-Japanese Jonathan passing, again we had very brisk backbleeding. We did this again two times to make sure that this was clear of anything. There was some kind of darkish blood that was removed, but again no major thrombus was identified. We had a lot of difficulty getting down the radial artery with our 4-Japanese Jonathan, but we were able to get down there with our 3-Japanese Jonathan and again, after Jonathan embolectomy, some kind of darkish blood was removed but no major thrombus was identified, but then we had brisk backbleeding. So, after this, we went ahead and closed with multiple interrupted 5-0 Prolene sutures with 2 repair sutures.   We then were able to get a great pulse, again felt bounding pulses now at the wrist for the radial and ulnar and then decision was made to just shoot an on-table angiography to make sure that we were not missing anything. A micropuncture needle was placed within the brachial artery. Once we had positive blood return, a Mandril wire was then placed and then a 4-Macedonian micropuncture sheath was placed. Angiogram was performed, showing no evidence of hemodynamically significant stenosis or occlusion within the brachial artery, ulnar artery, or radial artery on the left upper extremity, and then, there was the appearance of an intact palmar arch. We then decided to remove our sheath. This was closed with a 5-0 Prolene stitch. We again continued to have great pulses at the end. We copiously irrigated the wound, closed with a 3-0 Vicryl deep dermal layer, 4-0 Vicryl subcuticular layer, and Exofin for dressing. The patient tolerated the procedure well and was taken to the ICU in a stable condition.         Denise Nguyen MD [id number]  D:  11/06/2019 2:21  T:  11/06/2019 7:40  JOB #:  [id number]

## 2019-11-06 NOTE — INTERDISCIPLINARY ROUNDS
ICU is not on case at this time. RN, pharmacy, care management, and dietary in rounds. Patient needs a code status. Patient had a \"panic attack\" last night. Became diaphoretic and stated he could not breathe. Patient was redirected and given pain medication. Possibly withdrawal from drugs and ETOH. Will score for CIWA.

## 2019-11-06 NOTE — ANESTHESIA POSTPROCEDURE EVALUATION
Procedure(s):  Embolectomy Of Left Upper Extremity With Angiogram.    general    Anesthesia Post Evaluation      Multimodal analgesia: multimodal analgesia used between 6 hours prior to anesthesia start to PACU discharge  Patient location during evaluation: bedside  Patient participation: complete - patient participated  Level of consciousness: awake  Pain management: adequate  Airway patency: patent  Anesthetic complications: no  Cardiovascular status: stable  Respiratory status: acceptable  Hydration status: acceptable  Post anesthesia nausea and vomiting:  controlled      Vitals Value Taken Time   /100 11/6/2019  2:43 AM   Temp 37 °C (98.6 °F) 11/6/2019  2:27 AM   Pulse 109 11/6/2019  2:47 AM   Resp 17 11/6/2019  2:47 AM   SpO2 94 % 11/6/2019  2:47 AM   Vitals shown include unvalidated device data.

## 2019-11-06 NOTE — ANESTHESIA PREPROCEDURE EVALUATION
Relevant Problems   No relevant active problems       Anesthesia Evaluation         Anesthetic Plan    ASA: 3, emergent  Anesthesia type: general

## 2019-11-06 NOTE — ROUTINE PROCESS
0730 Bedside report given to Edmundo Út [address]. (onoming nurse) per Maddison Gomez RN (offgoing nurse) utilizing SBAR, MAR, Kardex, I&O, results review, and EKG interpretation with rate and rhythm.

## 2019-11-06 NOTE — PROGRESS NOTES
46 Pt is panicking and scared, jumping off bed \"stating he cannot breath, wants water, his lungs is closing off\". Pt is diaphoretic, tachycardic. Lungs sound is very coarse. No stridor. Diminished on the rt lung. Put on nasal cannula. Pt starts to calm down. O2 sat at 100%. Dr. Shwetha Casillas notified via phone. 1000 MRI tech called and stated that MRI order need to be changed to MRI of Chest without contrast if brachial flexus is need to be scan. OFFICE of  notified    [phone] to [facility] in Dr. Orville Hahn office,  stated that she gave the message and  MD wants the MRI of shoulder. 1650 Off unit to the MRI. Ok to be transported without a nurse and monitor    [facility] REPORT:    Verbal report given to Olga QUIGLEY(name) on [facility]  [facility] for routine progression of care       Report consisted of patients Situation, Background, Assessment and   Recommendations(SBAR). Information from the following report(s) Kardex, Procedure Summary, Intake/Output, MAR and Cardiac Rhythm SR-ST was reviewed with the receiving nurse.     Lines:   Peripheral IV 11/05/19 Right Antecubital (Active)   Site Assessment Clean, dry, & intact 11/6/2019  4:00 PM   Phlebitis Assessment 0 11/6/2019  4:00 PM   Infiltration Assessment 0 11/6/2019  4:00 PM   Dressing Status Clean, dry, & intact 11/6/2019  2:00 PM   Dressing Type Transparent 11/6/2019  8:00 AM   Hub Color/Line Status Capped 11/6/2019  8:00 AM   Action Taken Open ports on tubing capped 11/6/2019  8:00 AM   Alcohol Cap Used Yes 11/6/2019  8:00 AM       Peripheral IV 11/05/19 Right Antecubital (Active)   Site Assessment Clean, dry, & intact 11/6/2019  4:00 PM   Phlebitis Assessment 0 11/6/2019  4:00 PM   Infiltration Assessment 0 11/6/2019  4:00 PM   Dressing Status Clean, dry, & intact 11/6/2019  4:00 PM   Dressing Type Transparent 11/6/2019  8:00 AM   Hub Color/Line Status Pink;Capped 11/6/2019  8:00 AM   Action Taken Open ports on tubing capped 11/6/2019  8:00 AM   Alcohol Cap Used Yes 11/6/2019  8:00 AM        Opportunity for questions and clarification was provided. Patient will transported to Room 3011 after MRI.

## 2019-11-06 NOTE — ROUTINE PROCESS
TRANSFER - IN REPORT: 
 
Verbal report received from Bayron Ferreira, [facility],  charge nurse on Thony Skelton  being received from ICU (unit) for routine progression of care, and care assumed. Report consisted of patients Situation, Background, Assessment and  
Recommendations(SBAR). Information from the following report(s) SBAR, Kardex, Intake/Output, MAR, Accordion and Recent Results was reviewed with the receiving nurse. Assessment completed upon patients arrival to unit. Pt arrived on unit with eye glasses shattered on right side, still being worn by pt. Pt oriented to unit and room, all questions answered and clarifications provided, call bell within reach, bed in the lowest locked position. 1930 - Bedside and Verbal shift change report given to Natalia Donis RN (oncoming nurse) by Lonnie Long RN (offgoing nurse). Report included the following information SBAR, Kardex, MAR and Recent Results.

## 2019-11-06 NOTE — PERIOP NOTES
3335 Patient arrived to PACU. Assumed care. Connected to monitor. VSS. Will continue to monitor    0249 patient transferred to ICU. No changes in assessment. Patients point of contact taken to ICU waiting room (ICU staff made aware of location).

## 2019-11-06 NOTE — BRIEF OP NOTE
BRIEF OPERATIVE NOTE    Date of Procedure: 11/6/2019   Preoperative Diagnosis: Neurapraxia of left upper extremity, initial encounter  Extrinsic arterial compression  Postoperative Diagnosis: Neurapraxia of left upper extremity, initial encounter  Extrinsic arterial compression    Procedure(s):  Embolectomy Of Left Upper Extremity With Possible Angiogram  Surgeon(s) and Role:     * Parmjit Venegas MD - Primary         Surgical Assistant: none    Surgical Staff:  Circ-1: Kate Cast RN  Scrub Tech-1: Veronica Estrada  Surg Asst-1: Zoila Diaz  Surg Asst-2: Arline ESTRADA  Event Time In Time Out   Incision Start 11/06/2019 0111    Incision Close       Anesthesia: MAC   Estimated Blood Loss: 150mL  Specimens: * No specimens in log *   Findings: poor flow noted within brachial and ulnar arteries   Complications: none  Implants: * No implants in log *

## 2019-11-06 NOTE — INTERVAL H&P NOTE
H&P Update: 
Blossom Burns was seen and examined. History and physical has been reviewed. The patient has been examined.  There have been no significant clinical changes since the completion of the originally dated History and Physical.

## 2019-11-06 NOTE — PROGRESS NOTES
0300 Assumed care of pt after pt to 2603 via bed from OR. AAO x 4 but drowsy. LATIF x 3 with exception of lt forearm which is flaccid, floppy from elbow to hand. Connected to monitor and denotes NSR?ST. Lungs clear diminished in bases. Pt on RA. Pulses in lt arm palpable. Abd soft, nontender. Voiding in urinal as needed. 0330 Dr. Caron Chew paged and received orders to d/c Heparin drip and IV fluids. 0400 Girlfriend in to see pt. Pt in no acute distress. Pt drowsy but easily arouseable. 0530 AM labs drawn and sent to lab.0600 Pt voided in urinal 500 cc shanique urine. Still unable to move lt forearm from elbow to hand.

## 2019-11-06 NOTE — PROGRESS NOTES
Pt seen and examined. States still unable to move LUE and that his arm/hand feels cold. Visit Vitals  BP (!) 156/104   Pulse 88   Temp 99.7 °F (37.6 °C)   Resp 15   Ht 5' 11\" (1.803 m)   Wt 165 lb (74.8 kg)   SpO2 100%   BMI 23.01 kg/m²     NAD  RRR  LUE incision c/d/i.  2+ radial pulse and LUE warm and well perfused. 52 M POD #0 s/p left brachial artery thrombectomy without obvious thrombus with persistent LUE paralysis    1) Appreciate ortho and cardiology input. Will follow up MRI  2) Unsure if pt's symptoms were 2/2 ischemia. Perhaps due to brachial plexus injury? Pt unable to move any part of LUE distal to shoulder which does not correlate with findings of perfusion to bifurcation at least on CTA  3) Consult placed to hospitalist service for evaluation.

## 2019-11-06 NOTE — PROGRESS NOTES
Discharge/Transition Planning    Problem: Discharge Planning  Goal: *Discharge to safe environment  Outcome: Progressing Towards Goal           Reason for Admission:   Critical ischemia of upper extremity                   RRAT Score:       6              Plan for utilizing home health:      tbd                    Current Advanced Directive/Advance Care Plan: doesn't have                         Transition of Care Plan:                    Interviewed patient. Verified demographics listed on face sheet with patient; all information correct. Uninsured. Had been working but no benefits. Patient stated their PCP is none Patient lives in apt with girlfriend . Patient's NOK is brother and Aunt. Patient independent with ADLs prior to admission. No use of DME prior to admission. Discharge plan is Home and will eval for any needs. Notified him Med Assist will eval for financial ashly vs Medicaid ashly. Patient has designated __brother and Aunt______________________ to participate in his/her discharge plan and to receive any needed information. Neftali Burns Other Relative [phone]     Dave Adam ([address]) Other Relative   [phone]         Care Management Interventions  PCP Verified by CM: No  Mode of Transport at Discharge: Other (see comment)  Transition of Care Consult (CM Consult): Discharge Planning  Current Support Network:  Other(apartment with girlfriend)  Confirm Follow Up Transport: Self  Plan discussed with Pt/Family/Caregiver: Yes  Discharge Location  Discharge Placement: Home

## 2019-11-06 NOTE — CONSULTS
Surgery Consult      Patient: Shavon Burns MRN: [id number]  CSN: [id number]      YOB: 1970    Age: 52 y.o. Sex: male      DOA: 11/5/2019       HPI:     Praveen Sousa is a 52 y.o. male who presents with acute onset of left arm paralysis and loss of sensation. He states that he was doing a bit of drinking last night. He fell asleep on his chair and must have fallen over. He states that he woke up on his left arm with an inability to move and feel the arm. He woke up around 1583-3118. He knew he wasn't having a stroke because he could move his shoulder and his mother had a stroke and this didn't seem like that. He was hoping it would improve and get better but unfortunately it did not. He finally came to the ED at 1830. He went to CT and I was notified of poor flow on CTA at 2151. I was in ED at 2220 reviewed CTA and examined patient also performed quick ultrasound viewing of brachial and radial arteries. Patient denies taking any drugs. No past medical history on file. No past surgical history on file. No family history on file. Social History     Socioeconomic History    Marital status: SINGLE     Spouse name: Not on file    Number of children: Not on file    Years of education: Not on file    Highest education level: Not on file   Tobacco Use    Smoking status: Current Every Day Smoker     Years: 20.00   Substance and Sexual Activity    Alcohol use: Yes       Prior to Admission medications    Medication Sig Start Date End Date Taking? Authorizing Provider   albuterol (PROVENTIL HFA, VENTOLIN HFA, PROAIR HFA) 90 mcg/actuation inhaler Take 2 Puffs by inhalation every four (4) hours as needed for Wheezing.    Yes Other, MD Jarad   traMADol (ULTRAM) 50 mg tablet Sig: Take 1-2 tablets every 6 hours PRN severe pain 2/10/13   GIUSEPPE Bhakta       Allergies   Allergen Reactions    Shellfish Derived Angioedema       Physical Exam:      Visit Vitals  BP (!) 170/116 Pulse 77   Temp 98.3 °F (36.8 °C)   Resp 17   Ht 5' 9\" (1.753 m)   Wt 150 lb (68 kg)   SpO2 100%   BMI 22.15 kg/m²       GENERAL: alert, cooperative, no distress, appears stated age, EYE: negative findings: anicteric sclera and EOMI, THROAT & NECK: normal, no erythema or exudates noted. and mucous membranes moist, LUNG: clear to auscultation bilaterally, HEART: regular rate and rhythm, S1, S2 normal, no murmur, click, rub or gallop, ABDOMEN: soft, non-tender. Bowel sounds normal. No masses,  no organomegaly, EXTREMITIES:  RUE is w/w/p 2+ radial and 2+ ulnar pulses, LUE is cool and flacid with no sensation from mid upper arm down, he does have some minor thenar twitching at times, 2+ radial and 1+ ulnar pulses, NEUROLOGIC: negative findings: alert, oriented x3  speech normal in context and clarity  memory intact grossly  cranial nerves II-XII intact  no involuntary movements - tremors    ROS:  Constitutional: negative  Eyes: negative  Ears, nose, mouth, throat, and face: negative  Respiratory: negative  Cardiovascular: negative  Gastrointestinal: negative  Genitourinary:negative  Hematologic/lymphatic: negative  Musculoskeletal:negative  Neurological: negative  Behavioral/Psych: negative  Endocrine: negative  Allergic/Immunologic: negative  Unless otherwise mentioned in the HPI.     Data Review:    CBC:   Lab Results   Component Value Date/Time    WBC 9.8 11/05/2019 06:49 PM    RBC 4.77 11/05/2019 06:49 PM    HGB 15.1 11/05/2019 06:49 PM    HCT 45.0 11/05/2019 06:49 PM    PLATELET 997 54/61/0866 06:49 PM      BMP:   Lab Results   Component Value Date/Time    Glucose 113 (H) 11/05/2019 06:49 PM    Sodium 139 11/05/2019 06:49 PM    Potassium 4.0 11/05/2019 06:49 PM    Chloride 103 11/05/2019 06:49 PM    CO2 29 11/05/2019 06:49 PM    BUN 12 11/05/2019 06:49 PM    Creatinine 1.07 11/05/2019 06:49 PM    Calcium 9.4 11/05/2019 06:49 PM     Coagulation: No results found for: PTP, INR, APTT, INREXT      Assessment/Plan 51 y/o male with possible local thrombus within distal brachial artery. --I have reviewed the CTA but it is very pixilated and difficult to fully view under magnification but does appear to have a target sign in distal brachial with good opacification of his ulnar but not radial artery  --bedside ultrasound shows patent radial and ulnar arteries but appears to have some intra-arterial mass within distal brachial artery into proximal ulnar artery. Very short segment but clear hyperechoic mass within brachial artery. --given coolness to arm I have recommended surgical embolectomy. I have let the patient know that we are outside the 6-8 hour window and there is a distinct possibility of us improving his blood flow but having no improvement in his neuromuscular exam.  --Patient requested that we wait till tomorrow. I let him know that this is an emergent surgical need and delay will only further decrease the odds of return of function. --Patient was given the risks and benefits of surgery including but not limited to bleeding, infection, damage to adjacent structures, MI, stroke, death, loss of arm, useless arm, and need for further surgery. --Currently patient is agreeable to surgery. --further plan after surgery  --appreciate any and all help from hand surgery as patient is very likely going to have serious deleterious effects from prolonged ischemia time and neuromuscular deficits. Active Problems:    * No active hospital problems.  Piotr Malloy MD  November 5, 2019

## 2019-11-06 NOTE — PROGRESS NOTES
Pt c/o \"panic attack\" in MRI scanner with symptoms of unable to breathe. Pt wished to terminate exam and states he will try again tomorrow with sedation.

## 2019-11-06 NOTE — H&P (VIEW-ONLY)
Surgery Consult Patient: Henny Burns MRN: [id number]  [facility]: [id number] YOB: 1970 Age: 52 y.o. Sex: male DOA: 11/5/2019 HPI:  
 
Henny Burns is a 52 y.o. male who presents with acute onset of left arm paralysis and loss of sensation. He states that he was doing a bit of drinking last night. He fell asleep on his chair and must have fallen over. He states that he woke up on his left arm with an inability to move and feel the arm. He woke up around 1310-9505. He knew he wasn't having a stroke because he could move his shoulder and his mother had a stroke and this didn't seem like that. He was hoping it would improve and get better but unfortunately it did not. He finally came to the ED at 1830. He went to CT and I was notified of poor flow on CTA at 2151. I was in ED at 2220 reviewed CTA and examined patient also performed quick ultrasound viewing of brachial and radial arteries. Patient denies taking any drugs. No past medical history on file. No past surgical history on file. No family history on file. Social History Socioeconomic History  Marital status: SINGLE Spouse name: Not on file  Number of children: Not on file  Years of education: Not on file  Highest education level: Not on file Tobacco Use  Smoking status: Current Every Day Smoker Years: 20.00 Substance and Sexual Activity  Alcohol use: Yes Prior to Admission medications Medication Sig Start Date End Date Taking? Authorizing Provider  
albuterol (PROVENTIL HFA, VENTOLIN HFA, PROAIR HFA) 90 mcg/actuation inhaler Take 2 Puffs by inhalation every four (4) hours as needed for Wheezing. Yes Other, MD Jarad  
traMADol (ULTRAM) 50 mg tablet Sig: Take 1-2 tablets every 6 hours PRN severe pain 2/10/13   GIUSEPPE [place] Allergies Allergen Reactions  Shellfish Derived Angioedema Physical Exam:  
  
Visit Vitals BP (!) 170/116 Pulse 77 Temp 98.3 °F (36.8 °C) Resp 17 Ht 5' 9\" (1.753 m) Wt 150 lb (68 kg) SpO2 100% BMI 22.15 kg/m² GENERAL: alert, cooperative, no distress, appears stated age, EYE: negative findings: anicteric sclera and EOMI, THROAT & NECK: normal, no erythema or exudates noted. and mucous membranes moist, LUNG: clear to auscultation bilaterally, HEART: regular rate and rhythm, S1, S2 normal, no murmur, click, rub or gallop, ABDOMEN: soft, non-tender. Bowel sounds normal. No masses,  no organomegaly, EXTREMITIES:  RUE is w/w/p 2+ radial and 2+ ulnar pulses, LUE is cool and flacid with no sensation from mid upper arm down, he does have some minor thenar twitching at times, 2+ radial and 1+ ulnar pulses, NEUROLOGIC: negative findings: alert, oriented x3 
speech normal in context and clarity 
memory intact grossly 
cranial nerves II-XII intact 
no involuntary movements - tremors ROS: 
Constitutional: negative Eyes: negative Ears, nose, mouth, throat, and face: negative Respiratory: negative Cardiovascular: negative Gastrointestinal: negative Genitourinary:negative Hematologic/lymphatic: negative Musculoskeletal:negative Neurological: negative Behavioral/Psych: negative Endocrine: negative Allergic/Immunologic: negative Unless otherwise mentioned in the HPI. Data Review: CBC:  
Lab Results Component Value Date/Time WBC 9.8 11/05/2019 06:49 PM  
 RBC 4.77 11/05/2019 06:49 PM  
 HGB 15.1 11/05/2019 06:49 PM  
 HCT 45.0 11/05/2019 06:49 PM  
 PLATELET 674 44/57/8558 06:49 PM  
  
BMP:  
Lab Results Component Value Date/Time  Glucose 113 (H) 11/05/2019 06:49 PM  
 Sodium 139 11/05/2019 06:49 PM  
 Potassium 4.0 11/05/2019 06:49 PM  
 Chloride 103 11/05/2019 06:49 PM  
 CO2 29 11/05/2019 06:49 PM  
 BUN 12 11/05/2019 06:49 PM  
 Creatinine 1.07 11/05/2019 06:49 PM  
 Calcium 9.4 11/05/2019 06:49 PM  
 
Coagulation: No results found for: PTP, INR, APTT, INREXT 
 
 
 Assessment/Plan  
 
51 y/o male with possible local thrombus within distal brachial artery. --I have reviewed the CTA but it is very pixilated and difficult to fully view under magnification but does appear to have a target sign in distal brachial with good opacification of his ulnar but not radial artery 
--bedside ultrasound shows patent radial and ulnar arteries but appears to have some intra-arterial mass within distal brachial artery into proximal ulnar artery. Very short segment but clear hyperechoic mass within brachial artery. --given coolness to arm I have recommended surgical embolectomy. I have let the patient know that we are outside the 6-8 hour window and there is a distinct possibility of us improving his blood flow but having no improvement in his neuromuscular exam. 
--Patient requested that we wait till tomorrow. I let him know that this is an emergent surgical need and delay will only further decrease the odds of return of function. --Patient was given the risks and benefits of surgery including but not limited to bleeding, infection, damage to adjacent structures, MI, stroke, death, loss of arm, useless arm, and need for further surgery. --Currently patient is agreeable to surgery. --further plan after surgery 
--appreciate any and all help from hand surgery as patient is very likely going to have serious deleterious effects from prolonged ischemia time and neuromuscular deficits. Active Problems: * No active hospital problems. * Maury Shah MD 
November 5, 2019

## 2019-11-06 NOTE — CONSULTS
[facility] - Consult Note    Consultation request by Dr. Caron Chew for advice/opinion related to evaluating elevated troponin    Date of  Admission: 11/5/2019  6:23 PM   Primary Care Physician:  None      I saw, evaluated, interviewed and examined the patient personally. I agree with the findings and plan of care as documented below with PA-C note  Patient admitted with acute upper extremity limb ischemia with motor and sensory loss. Underwent emergent left brachial artery embolectomy. Incidental finding of elevated troponin in the setting of markedly elevated CK level likely because of skeletal muscle injury because of limb ischemia. Denies any chest pain or chest tightness or shortness of breath. He admits to cocaine use couple times a month and daily use of alcohol and smoking and marijuana. Echo revealed normal EF with moderate LVH. Most likely explanation of his LVH is accelerated hypertension, cocaine use and less likely hypertrophic cardiomyopathy denies any family history of sudden cardiac death or cardiac disease  Currently he is asymptomatic from cardiac standpoint. Echo does not reveal any LV thrombus. EKG showed LVH and sinus rhythm  On dual antiplatelet agent at this time  We will start amlodipine 10 mg daily for hypertension  Would avoid any diuretics because of hyperdynamic LV to avoid any LV obstruction and also would avoid any beta-blocker in setting of ongoing cocaine use. Advised patient strongly against further cocaine use and abstinence from alcohol and smoking. Yemi Nash MD        Assessment:     -Acute ischemia to LUE. S/p embolectomy of left brachial artery, left radial artery, left ulnar artery by Dr. Caron Chew 11/6/2019. Vascular surgery, orthopedics following.    -Elevated troponin 0.85 --> 1.10, associated with demand ischemia in setting of uncontrolled HTN and acute limb ischemia. No symptoms to suggest ACS. -HTN, uncontrolled.   Pt states has been told BP elevated in the past but has never taken medications for HTN. -Polysubstance use. Pt reports ~16 pack-year smoking history, drinks 3-4 cans of beer/day, daily marijuana use, also recent cocaine use. Plan:     -Will check Echocardiogram to evaluate function and to r/o underlying cardiac thrombus.  -Importance of cessation of polysubstance use (tobacco, alcohol, cocaine) was emphasized to patient. -Management of elevated BP per primary team.     History of Present Illness: This is a 52 y.o. male admitted for No admission diagnoses are documented for this encounter. .    Patient complains of:  Unable to use left arm    Rossi Caban is a 52 y.o. male with PMHx as described above, who presented to the hospital due to inability to use left arm that was noticed upon awakening around 09:00 yesterday morning. Pt states he went to sleep around 03:00-04:00 and woke up on the floor on his left side. He initially thought his symptoms were related to his position while sleeping, but he says he also noticed that his left hand seemed cold as well. He is able to move his left shoulder but otherwise unable to move his left arm/hand. He did not appreciate any other deficits to his left leg or to his face. He states his symptoms are purely limited to his left upper extremity. No c/o chest pain/discomfort or new shortness of breath. Cardiac risk factors: smoking/ tobacco exposure, male gender, hypertension      Review of Symptoms:  Except as stated above include:  Constitutional:  negative  Respiratory:  negative  Cardiovascular:  negative  Gastrointestinal: negative  Genitourinary:  negative  Musculoskeletal:  negative  Neurological:  Negative  Dermatological:  Negative  Endocrinological: Negative  Psychological:  Negative       Past Medical History:   History reviewed. No pertinent past medical history.       Social History:     Social History     Socioeconomic History    Marital status: SINGLE     Spouse name: Not on file    Number of children: Not on file    Years of education: Not on file    Highest education level: Not on file   Tobacco Use    Smoking status: Current Every Day Smoker     Years: 20.00   Substance and Sexual Activity    Alcohol use: Yes        Family History:   History reviewed. No pertinent family history. Medications:      Allergies   Allergen Reactions    Shellfish Derived Angioedema        Current Facility-Administered Medications   Medication Dose Route Frequency    sodium chloride (NS) flush 5-40 mL  5-40 mL IntraVENous Q8H    sodium chloride (NS) flush 5-40 mL  5-40 mL IntraVENous PRN    acetaminophen (TYLENOL) tablet 650 mg  650 mg Oral Q4H PRN    oxyCODONE-acetaminophen (PERCOCET) 5-325 mg per tablet 1 Tab  1 Tab Oral Q4H PRN    morphine injection 2 mg  2 mg IntraVENous Q2H PRN    naloxone (NARCAN) injection 0.4 mg  0.4 mg IntraVENous PRN    ondansetron (ZOFRAN) injection 4 mg  4 mg IntraVENous Q4H PRN    aspirin delayed-release tablet 81 mg  81 mg Oral DAILY    diphenhydrAMINE (BENADRYL) injection 12.5 mg  12.5 mg IntraVENous Q4H PRN    docusate sodium (COLACE) capsule 100 mg  100 mg Oral BID    heparin (porcine) injection 5,000 Units  5,000 Units SubCUTAneous Q8H    clopidogrel (PLAVIX) tablet 75 mg  75 mg Oral DAILY    albuterol (PROVENTIL VENTOLIN) nebulizer solution 2.5 mg  2.5 mg Nebulization Q4H PRN         Physical Exam:     Visit Vitals  BP (!) 159/103   Pulse 87   Temp 99.7 °F (37.6 °C)   Resp 18   Ht 5' 11\" (1.803 m)   Wt 165 lb (74.8 kg)   SpO2 100%   BMI 23.01 kg/m²     BP Readings from Last 3 Encounters:   11/06/19 (!) 159/103   02/10/13 (!) 135/92     Pulse Readings from Last 3 Encounters:   11/06/19 87   02/10/13 84     Wt Readings from Last 3 Encounters:   11/06/19 165 lb (74.8 kg)   11/05/19 165 lb (74.8 kg)       General:  alert, cooperative, no distress, appears stated age  Neck:  supple  Chest wall: Well-healed scars to R chest and R scapula area  Lungs:  clear to auscultation bilaterally  Heart:  Regular rate and rhythm  Abdomen:  abdomen is soft without significant tenderness, masses, organomegaly or guarding  Extremities:  Atraumatic, no edema  Skin: Warm and dry.    Neuro: alert, oriented x3, affect appropriate, unable to move LUE below shoulder  Psych: non focal     Data Review:     Recent Labs     11/06/19 0530 11/05/19  1849   WBC 7.1 9.8   HGB 15.0 15.1   HCT 45.0 45.0    195     Recent Labs     11/06/19 0530 11/05/19  1849    139   K 4.4 4.0    103   CO2 26 29   GLU 91 113*   BUN 9 12   CREA 0.78 1.07   CA 9.2 9.4   ALB  --  4.0   SGOT  --  176*   ALT  --  66*       Results for orders placed or performed during the hospital encounter of 11/05/19   EKG, 12 LEAD, INITIAL   Result Value Ref Range    Ventricular Rate 82 BPM    Atrial Rate 82 BPM    P-R Interval 180 ms    QRS Duration 90 ms    Q-T Interval 414 ms    QTC Calculation (Bezet) 483 ms    Calculated P Axis 30 degrees    Calculated R Axis 30 degrees    Calculated T Axis -175 degrees    Diagnosis       Normal sinus rhythm  Left ventricular hypertrophy with repolarization abnormality ( R in aVL ,   Eloy product , Romhilt-Augustin )  Possible Lateral infarct , age undetermined  Abnormal ECG  No previous ECGs available         All Cardiac Markers in the last 24 hours:    Lab Results   Component Value Date/Time    CPK 4,973 (H) 11/05/2019 11:23 PM    CPK 5,600 (H) 11/05/2019 06:49 PM    CKMB 94.1 (H) 11/05/2019 11:23 PM    CKMB 116.4 (H) 11/05/2019 06:49 PM    CKND1 1.9 11/05/2019 11:23 PM    CKND1 2.1 11/05/2019 06:49 PM    TROIQ 1.10 (H) 11/05/2019 11:23 PM    TROIQ 0.85 (H) 11/05/2019 06:49 PM         Signed By: Leona Andujar PA-C     November 6, 2019
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
## 2019-11-07 ENCOUNTER — APPOINTMENT (OUTPATIENT)
Dept: VASCULAR SURGERY | Age: 49
DRG: 182 | End: 2019-11-07
Attending: INTERNAL MEDICINE
Payer: MEDICAID

## 2019-11-07 ENCOUNTER — APPOINTMENT (OUTPATIENT)
Dept: MRI IMAGING | Age: 49
DRG: 182 | End: 2019-11-07
Attending: SURGERY
Payer: MEDICAID

## 2019-11-07 ENCOUNTER — APPOINTMENT (OUTPATIENT)
Dept: NON INVASIVE DIAGNOSTICS | Age: 49
DRG: 182 | End: 2019-11-07
Attending: PHYSICIAN ASSISTANT
Payer: MEDICAID

## 2019-11-07 LAB
ALBUMIN SERPL-MCNC: 3.3 G/DL (ref 3.4–5)
ALBUMIN/GLOB SERPL: 0.9 {RATIO} (ref 0.8–1.7)
ALP SERPL-CCNC: 87 U/L (ref 45–117)
ALT SERPL-CCNC: 56 U/L (ref 16–61)
ANION GAP SERPL CALC-SCNC: 7 MMOL/L (ref 3–18)
AST SERPL-CCNC: 108 U/L (ref 10–38)
ATRIAL RATE: 82 BPM
ATRIAL RATE: 88 BPM
BASOPHILS # BLD: 0 K/UL (ref 0–0.1)
BASOPHILS NFR BLD: 0 % (ref 0–2)
BILIRUB DIRECT SERPL-MCNC: 0.2 MG/DL (ref 0–0.2)
BILIRUB SERPL-MCNC: 0.5 MG/DL (ref 0.2–1)
BUN SERPL-MCNC: 8 MG/DL (ref 7–18)
BUN/CREAT SERPL: 10 (ref 12–20)
CALCIUM SERPL-MCNC: 9.1 MG/DL (ref 8.5–10.1)
CALCULATED P AXIS, ECG09: 30 DEGREES
CALCULATED P AXIS, ECG09: 52 DEGREES
CALCULATED R AXIS, ECG10: -4 DEGREES
CALCULATED R AXIS, ECG10: 30 DEGREES
CALCULATED T AXIS, ECG11: -175 DEGREES
CALCULATED T AXIS, ECG11: 163 DEGREES
CHLORIDE SERPL-SCNC: 104 MMOL/L (ref 100–111)
CK SERPL-CCNC: 2027 U/L (ref 39–308)
CO2 SERPL-SCNC: 27 MMOL/L (ref 21–32)
CREAT SERPL-MCNC: 0.8 MG/DL (ref 0.6–1.3)
DIAGNOSIS, 93000: NORMAL
DIAGNOSIS, 93000: NORMAL
DIFFERENTIAL METHOD BLD: ABNORMAL
EOSINOPHIL # BLD: 0 K/UL (ref 0–0.4)
EOSINOPHIL NFR BLD: 0 % (ref 0–5)
ERYTHROCYTE [DISTWIDTH] IN BLOOD BY AUTOMATED COUNT: 13.8 % (ref 11.6–14.5)
GLOBULIN SER CALC-MCNC: 3.8 G/DL (ref 2–4)
GLUCOSE BLD STRIP.AUTO-MCNC: 90 MG/DL (ref 70–110)
GLUCOSE SERPL-MCNC: 86 MG/DL (ref 74–99)
HCT VFR BLD AUTO: 44.8 % (ref 36–48)
HGB BLD-MCNC: 15.1 G/DL (ref 13–16)
INR PPP: 1 (ref 0.8–1.2)
LYMPHOCYTES # BLD: 2.3 K/UL (ref 0.9–3.6)
LYMPHOCYTES NFR BLD: 32 % (ref 21–52)
MAGNESIUM SERPL-MCNC: 1.9 MG/DL (ref 1.6–2.6)
MCH RBC QN AUTO: 31.5 PG (ref 24–34)
MCHC RBC AUTO-ENTMCNC: 33.7 G/DL (ref 31–37)
MCV RBC AUTO: 93.3 FL (ref 74–97)
MONOCYTES # BLD: 0.9 K/UL (ref 0.05–1.2)
MONOCYTES NFR BLD: 13 % (ref 3–10)
NEUTS SEG # BLD: 4 K/UL (ref 1.8–8)
NEUTS SEG NFR BLD: 55 % (ref 40–73)
P-R INTERVAL, ECG05: 174 MS
P-R INTERVAL, ECG05: 180 MS
PHOSPHATE SERPL-MCNC: 2.2 MG/DL (ref 2.5–4.9)
PLATELET # BLD AUTO: 187 K/UL (ref 135–420)
PMV BLD AUTO: 11.3 FL (ref 9.2–11.8)
POTASSIUM SERPL-SCNC: 3.7 MMOL/L (ref 3.5–5.5)
PROT SERPL-MCNC: 7.1 G/DL (ref 6.4–8.2)
PROTHROMBIN TIME: 13.3 SEC (ref 11.5–15.2)
Q-T INTERVAL, ECG07: 390 MS
Q-T INTERVAL, ECG07: 414 MS
QRS DURATION, ECG06: 84 MS
QRS DURATION, ECG06: 90 MS
QTC CALCULATION (BEZET), ECG08: 471 MS
QTC CALCULATION (BEZET), ECG08: 483 MS
RBC # BLD AUTO: 4.8 M/UL (ref 4.7–5.5)
SODIUM SERPL-SCNC: 138 MMOL/L (ref 136–145)
VENTRICULAR RATE, ECG03: 82 BPM
VENTRICULAR RATE, ECG03: 88 BPM
WBC # BLD AUTO: 7.3 K/UL (ref 4.6–13.2)

## 2019-11-07 PROCEDURE — 74011250636 HC RX REV CODE- 250/636: Performed by: SURGERY

## 2019-11-07 PROCEDURE — 73221 MRI JOINT UPR EXTREM W/O DYE: CPT

## 2019-11-07 PROCEDURE — 93971 EXTREMITY STUDY: CPT

## 2019-11-07 PROCEDURE — 65270000029 HC RM PRIVATE

## 2019-11-07 PROCEDURE — 83735 ASSAY OF MAGNESIUM: CPT

## 2019-11-07 PROCEDURE — 74011250636 HC RX REV CODE- 250/636: Performed by: INTERNAL MEDICINE

## 2019-11-07 PROCEDURE — 97161 PT EVAL LOW COMPLEX 20 MIN: CPT

## 2019-11-07 PROCEDURE — 97166 OT EVAL MOD COMPLEX 45 MIN: CPT

## 2019-11-07 PROCEDURE — 84100 ASSAY OF PHOSPHORUS: CPT

## 2019-11-07 PROCEDURE — 80048 BASIC METABOLIC PNL TOTAL CA: CPT

## 2019-11-07 PROCEDURE — 93308 TTE F-UP OR LMTD: CPT

## 2019-11-07 PROCEDURE — 97110 THERAPEUTIC EXERCISES: CPT

## 2019-11-07 PROCEDURE — 80076 HEPATIC FUNCTION PANEL: CPT

## 2019-11-07 PROCEDURE — 82550 ASSAY OF CK (CPK): CPT

## 2019-11-07 PROCEDURE — 74011250637 HC RX REV CODE- 250/637: Performed by: INTERNAL MEDICINE

## 2019-11-07 PROCEDURE — 36415 COLL VENOUS BLD VENIPUNCTURE: CPT

## 2019-11-07 PROCEDURE — 82962 GLUCOSE BLOOD TEST: CPT

## 2019-11-07 PROCEDURE — 85610 PROTHROMBIN TIME: CPT

## 2019-11-07 PROCEDURE — 74011250637 HC RX REV CODE- 250/637: Performed by: SURGERY

## 2019-11-07 PROCEDURE — 74011250636 HC RX REV CODE- 250/636: Performed by: HOSPITALIST

## 2019-11-07 PROCEDURE — 85025 COMPLETE CBC W/AUTO DIFF WBC: CPT

## 2019-11-07 RX ORDER — LORAZEPAM 2 MG/ML
1 INJECTION INTRAMUSCULAR
Status: ACTIVE | OUTPATIENT
Start: 2019-11-07 | End: 2019-11-08

## 2019-11-07 RX ORDER — HYDRALAZINE HYDROCHLORIDE 20 MG/ML
10 INJECTION INTRAMUSCULAR; INTRAVENOUS
Status: DISCONTINUED | OUTPATIENT
Start: 2019-11-07 | End: 2019-11-09 | Stop reason: HOSPADM

## 2019-11-07 RX ADMIN — HEPARIN SODIUM 5000 UNITS: 5000 INJECTION INTRAVENOUS; SUBCUTANEOUS at 05:04

## 2019-11-07 RX ADMIN — AMLODIPINE BESYLATE 10 MG: 10 TABLET ORAL at 08:38

## 2019-11-07 RX ADMIN — DOCUSATE SODIUM 100 MG: 100 CAPSULE, LIQUID FILLED ORAL at 17:21

## 2019-11-07 RX ADMIN — Medication 10 ML: at 13:05

## 2019-11-07 RX ADMIN — DEXTROSE MONOHYDRATE, SODIUM CHLORIDE, AND POTASSIUM CHLORIDE 125 ML/HR: 50; 4.5; .745 INJECTION, SOLUTION INTRAVENOUS at 06:10

## 2019-11-07 RX ADMIN — HYDRALAZINE HYDROCHLORIDE 10 MG: 20 INJECTION INTRAMUSCULAR; INTRAVENOUS at 01:28

## 2019-11-07 RX ADMIN — MULTIPLE VITAMINS W/ MINERALS TAB 1 TABLET: TAB at 08:38

## 2019-11-07 RX ADMIN — ASPIRIN 81 MG: 81 TABLET, COATED ORAL at 08:37

## 2019-11-07 RX ADMIN — DOCUSATE SODIUM 100 MG: 100 CAPSULE, LIQUID FILLED ORAL at 08:38

## 2019-11-07 RX ADMIN — DEXTROSE MONOHYDRATE, SODIUM CHLORIDE, AND POTASSIUM CHLORIDE 125 ML/HR: 50; 4.5; .745 INJECTION, SOLUTION INTRAVENOUS at 16:54

## 2019-11-07 RX ADMIN — LORAZEPAM 1 MG: 2 INJECTION, SOLUTION INTRAMUSCULAR; INTRAVENOUS at 02:27

## 2019-11-07 RX ADMIN — Medication 10 ML: at 05:05

## 2019-11-07 RX ADMIN — HEPARIN SODIUM 5000 UNITS: 5000 INJECTION INTRAVENOUS; SUBCUTANEOUS at 22:01

## 2019-11-07 RX ADMIN — Medication 10 ML: at 05:04

## 2019-11-07 RX ADMIN — Medication 10 ML: at 22:02

## 2019-11-07 RX ADMIN — HEPARIN SODIUM 5000 UNITS: 5000 INJECTION INTRAVENOUS; SUBCUTANEOUS at 13:05

## 2019-11-07 RX ADMIN — CLOPIDOGREL BISULFATE 75 MG: 75 TABLET ORAL at 08:37

## 2019-11-07 NOTE — PROGRESS NOTES
1900   -- Bedside, Verbal and Written shift change report given to 2309 Radha Huang (oncoming nurse) byVARINDER (offgoing nurse). Allergy and FALL band placed on pt's wrist. Report included the following information SBAR, Kardex, Intake/Output, MAR and Recent Results. NO IV,BP, LAB ON LEFT ARM placed at bedside.      2039 -- PM medications administered, pt tolerated with ease, will continue to monitor. 2100 -- Urine sample collected taken to lab.     0000 -- Shift reassessment, pt condition unchanged, will continue to monitor. 0109 -- Yan PARISH, BP elevated 167/119, medication order approved.      0400 --  Shift reassessment, pt condition unchanged, will continue to monitor.         0700 -- Bedside, Verbal and Written shift change report given to 1316 Lam Lackeys nurse) by MITCHELL (offgoing nurse). Report included the following information SBAR, Kardex, Intake/Output, MAR and Recent Results.  Skin assessment completed.

## 2019-11-07 NOTE — PROGRESS NOTES
Problem: Falls - Risk of  Goal: *Absence of Falls  Description  Document Natalyaelizabeth Kinney Fall Risk and appropriate interventions in the flowsheet. Outcome: Progressing Towards Goal  Note:   Fall Risk Interventions:            Medication Interventions: Patient to call before getting OOB, Evaluate medications/consider consulting pharmacy    Elimination Interventions: Patient to call for help with toileting needs, Call light in reach    History of Falls Interventions: Evaluate medications/consider consulting pharmacy         Problem: Pressure Injury - Risk of  Goal: *Prevention of pressure injury  Description  Document Chinedu Scale and appropriate interventions in the flowsheet.   Outcome: Progressing Towards Goal  Note:   Pressure Injury Interventions:  Sensory Interventions: Assess changes in LOC, Pressure redistribution bed/mattress (bed type)         Activity Interventions: Pressure redistribution bed/mattress(bed type), Increase time out of bed    Mobility Interventions: Pressure redistribution bed/mattress (bed type)    Nutrition Interventions: Document food/fluid/supplement intake                     Problem: Pain  Goal: *Control of Pain  Outcome: Progressing Towards Goal  Goal: *PALLIATIVE CARE:  Alleviation of Pain  Outcome: Progressing Towards Goal     Problem: Tissue Perfusion - Cardiopulmonary, Altered  Goal: *Optimize tissue perfusion  Outcome: Progressing Towards Goal  Goal: *Absence of hypoxia  Outcome: Progressing Towards Goal

## 2019-11-07 NOTE — PROGRESS NOTES
Acknowledged duplicate PT orders. Patient  Previously evaluated and discharged. Please refer to PT evaluation for discharge recommendations and plan of care.      Thank you,    Pritesh Caro, PT, DPT
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
## 2019-11-07 NOTE — PROGRESS NOTES
[facility] - Progress Note  Admit Date: 11/5/2019  Patient seen and examined independently. Personally reviewed echocardiogram done yesterday. Echo demonstrates severe concentric hypertrophy and small left ventricular chamber. Will order bubble study. Continue telemetry observation. If patient has evidence for shunting across the septum, he will need venous Dopplers. MRI of the left upper extremity ordered. Agree with assessment and plan as noted below. Lina Fisher MD  Assessment:     Hospital Problems  Never Reviewed          Codes Class Noted POA    Critical ischemia of upper extremity ICD-10-CM: I99.8  ICD-9-CM: 459.9  11/6/2019 Unknown        Pain of left upper extremity due to ischemia ICD-10-CM: I99.8  ICD-9-CM: 459.9  11/6/2019 Unknown            -Acute ischemia to LUE. S/p embolectomy of left brachial artery, left radial artery, left ulnar artery by Dr. Karrie Henley 11/6/2019. Vascular surgery, orthopedics following.    -Elevated troponin 0.85 --> 1.10, associated with demand ischemia in setting of uncontrolled HTN and acute limb ischemia. No symptoms to suggest ACS. - Echo 11/06/19 with EF 56-60%, grade 1 DD, dilated left atrium, moderate LV hypertrophy more prominent septal involvement compared to rest of the LV segment. -HTN, uncontrolled. Pt states has been told BP elevated in the past but has never taken medications for HTN. -Polysubstance use. Pt reports ~16 pack-year smoking history, drinks 3-4 cans of beer/day, daily marijuana use, also recent cocaine use.     Plan:     - Left upper extremity MRI pending today  - No arrhythmia observed on telemetry  - Will followup with limited echo with bubble study   - Consider LE venous doppler study  - Consider further imaging pending results of above    Subjective:     No new complaints. No chest pain or shortness of breath. Left arm immobile.     Objective:      Patient Vitals for the past 8 hrs:   Temp Pulse Resp BP SpO2   11/07/19 0800 97.7 °F (36.5 °C) 82 16 (!) 161/107 100 %   11/07/19 0450 97.9 °F (36.6 °C) 94 18 (!) 154/102 98 %   11/07/19 0231   24 (!) 154/114          Patient Vitals for the past 96 hrs:   Weight   11/06/19 0958 165 lb (74.8 kg)   11/05/19 1914 150 lb (68 kg)                    Intake/Output Summary (Last 24 hours) at 11/7/2019 1019  Last data filed at 11/7/2019 0400  Gross per 24 hour   Intake 1447.46 ml   Output 1850 ml   Net -402.54 ml       Physical Exam:  General:  alert, cooperative, no distress  Neck:  nontender, no JVD  Lungs:  clear to auscultation bilaterally  Heart:  regular rate and rhythm, S1, S2 normal, no murmur, click, rub or gallop  Abdomen:  abdomen is soft without significant tenderness, masses, organomegaly or guarding  Extremities:  extremities normal, atraumatic, no cyanosis or edema, decreased sensation, immobile left upper extremity    Data Review:     Labs: Results:       Chemistry Recent Labs     11/07/19  0700 11/06/19  0530 11/05/19  1849   GLU 86 91 113*    138 139   K 3.7 4.4 4.0    106 103   CO2 27 26 29   BUN 8 9 12   CREA 0.80 0.78 1.07   CA 9.1 9.2 9.4   MG 1.9  --   --    PHOS 2.2*  --   --    AGAP 7 6 7   BUCR 10* 12 11*   AP 87  --  102   TP 7.1  --  8.2   ALB 3.3*  --  4.0   GLOB 3.8  --  4.2*   AGRAT 0.9  --  1.0      CBC w/Diff Recent Labs     11/07/19  0700 11/06/19  0530 11/05/19  1849   WBC 7.3 7.1 9.8   RBC 4.80 4.80 4.77   HGB 15.1 15.0 15.1   HCT 44.8 45.0 45.0    210 195   GRANS 55  --  78*   LYMPH 32  --  12*   EOS 0  --  0      Cardiac Enzymes Lab Results   Component Value Date/Time    CPK 2,027 (H) 11/07/2019 07:00 AM      Coagulation Recent Labs     11/07/19  0700 11/05/19  2323   PTP 13.3  --    INR 1.0  --    APTT  --  24.5       Lipid Panel No results found for: CHOL, CHOLPOCT, CHOLX, CHLST, CHOLV, 423765, HDL, HDLP, LDL, LDLC, DLDLP, 281400, VLDLC, VLDL, TGLX, TRIGL, TRIGP, TGLPOCT, CHHD, CHHDX   BNP No results found for: BNP, BNPP, XBNPT Liver Enzymes Recent Labs     11/07/19  0700   TP 7.1   ALB 3.3*   AP 87   SGOT 108*      Digoxin    Thyroid Studies Lab Results   Component Value Date/Time    TSH 0.59 11/06/2019 05:30 AM          Signed By: Drew Crouch.  Darline Valencia     November 7, 2019

## 2019-11-07 NOTE — PROGRESS NOTES
OT orders received, chart reviewed, and patient evaluated. Continue OT POC and refer to OT evaluation for discharge recommendations.     Thank you for your referral.  Tita Gonzales MS, OT/L

## 2019-11-07 NOTE — PROGRESS NOTES
PHYSICAL THERAPY EVALUATION AND DISCHARGE    Patient: Louis Burns (51 y.o. male)  Date: 11/7/2019  Primary Diagnosis: Pain of left upper extremity due to ischemia [I99.8]  Critical ischemia of upper extremity [I99.8]  Procedure(s) (LRB):  Embolectomy Of Left Upper Extremity With Angiogram (Left) Day of Surgery   Precautions: Fall  PLOF: Independent with ambulation    ASSESSMENT :  Based on the objective data described below, the patient presents with an appropriate functional mobility level for return to home. Supine to sit, Mod I for additional time. Sit to stand, Mod I. Demonstrates good sitting and standing balance. Patient ambulated approx 150 ft, w/o AD, Independent. Patient verbalized no mobility concerns for return to home. Patient left sitting upright in chair with call bell in reach. Education provided on bed mobility, transfers, ADLs, balance, amb, safety, exercise, role of PT, plan of care, cognition, skin integrity, vitals as indicated. Educated on need for RN assistance with mobility; verbalized understanding. Skilled physical therapy is not indicated at this time. PLAN :  Discharge Recommendations: Home Health  Further Equipment Recommendations for Discharge: LAMINE chen      SUBJECTIVE:   Patient stated \"Everything is good except for my left arm.     OBJECTIVE DATA SUMMARY:   History reviewed. No pertinent past medical history. History reviewed. No pertinent surgical history.   Barriers to Learning/Limitations: None  Compensate with: Visual Cues, Verbal Cues, Tactile Cues and Kinesthetic Cues  Home Situation:   Home Situation  Home Environment: Private residence  # Steps to Enter: 1  One/Two Story Residence: One story  Living Alone: No  Support Systems: Family member(s)  Patient Expects to be Discharged to[de-identified] Private residence  Current DME Used/Available at Home: None  Critical Behavior:  Neurologic State: Alert  Orientation Level: Oriented to person;Oriented to situation;Oriented to place  Cognition: Appropriate decision making; Follows commands     Psychosocial  Patient Behaviors: Calm; Cooperative  Purposeful Interaction: Yes                 Strength:    Manual Muscle Testing (LE)         R     L    Hip Flexion:   4+/5  4+/5  Knee EXT:   4+/5  4+/5  Knee FLEX:   4+/5  4+/5  Ankle DF:   4+/5  4+/5  _________________________________________________   Tone & Sensation: Tone and sensation: intact  Range Of Motion:  Bilateral LEs: WFL  Functional Mobility:  Bed Mobility:  Rolling: Modified independent  Supine to Sit: Modified independent  Sit to Supine: Modified independent  Transfers:  Sit to Stand: Modified independent  Stand to Sit: Modified independent    Balance:   Sitting: Intact  Standing: Intact  Ambulation/Gait Training:  Distance (ft): 150 Feet (ft)     Ambulation - Level of Assistance: Independent    Pain:  Pain level pre-treatment: 0/10   Pain level post-treatment: 0/10      Activity Tolerance:   Good    After treatment:   ?         Patient left in no apparent distress sitting up in chair  ? Patient left in no apparent distress in bed  ? Call bell left within reach  ? Nursing notified  ? Caregiver present  ? Bed alarm activated  ? SCDs applied    COMMUNICATION/EDUCATION:   ?         Role of physical therapy in the acute care setting. ?         Fall prevention education was provided and the patient/caregiver indicated understanding. ? Patient/family have participated as able in goal setting and plan of care. ?         Patient/family agree to work toward stated goals and plan of care. ?         Patient understands intent and goals of therapy, but is neutral about his/her participation. ? Patient is unable to participate in goal setting/plan of care: ongoing with therapy staff.     Thank you for this referral.  Susanne Zelaya   Time Calculation: 14 mins      Eval Complexity: History: MEDIUM  Complexity : 1-2 comorbidities / personal factors will impact the outcome/ POC Exam:MEDIUM Complexity : 3 Standardized tests and measures addressing body structure, function, activity limitation and / or participation in recreation  Presentation: MEDIUM Complexity : Evolving with changing characteristics  Clinical Decision Making:Low Complexity functional mobility, clinical findings, MMT, ROM  Overall Complexity:LOW

## 2019-11-07 NOTE — PROGRESS NOTES
Pt seen and examined. No new complaints. Still unable to move LUE    2+ radial pulse, incision c/d/i    MRI complete. Results no discrete injury but cannot r/o.   Will await ortho recommendations and dc planning

## 2019-11-07 NOTE — PROGRESS NOTES
Problem: Falls - Risk of  Goal: *Absence of Falls  Description  Document [facility] [place] Fall Risk and appropriate interventions in the flowsheet.   Outcome: Progressing Towards Goal  Note:   Fall Risk Interventions:            Medication Interventions: Evaluate medications/consider consulting pharmacy, Patient to call before getting OOB, Teach patient to arise slowly, Bed/chair exit alarm    Elimination Interventions: Bed/chair exit alarm, Call light in reach, Patient to call for help with toileting needs, Stay With Me (per policy), Toilet paper/wipes in reach, Urinal in reach    History of Falls Interventions: Bed/chair exit alarm, Investigate reason for fall

## 2019-11-07 NOTE — PROGRESS NOTES
Progress Note        Assessment:    1. Flaccid paralysis LUE, unclear etiology. 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 PLAN:    1. MRI to be attempted again today. Further recommendations pending results. Following            HPI: Pattie Burns is a 52 y.o. male patient without new complaints status post  for Pain of left upper extremity due to ischemia [I99.8]  Critical ischemia of upper extremity [I99.8] 11/5/2019. No new orthopaedic changes. Blood pressure (!) 154/102, pulse 94, temperature 97.9 °F (36.6 °C), resp. rate 18, height 5' 11\" (1.803 m), weight 74.8 kg (165 lb), SpO2 98 %. CBC w/Diff   Lab Results   Component Value Date/Time    WBC 7.3 11/07/2019 07:00 AM    RBC 4.80 11/07/2019 07:00 AM    HCT 44.8 11/07/2019 07:00 AM          Physical Assessment:  General: in no apparent distress   Extremities:   EXTREMITIES:  LUE:  Mild edema, decreased sensation throughout all dermatomes in LUE. No motor activity in c5-t1. Compartments soft. +2 radial pulse and good cap refill. No masses or LAD. Shoulder/elbow/wrist/hand joints supple. Dressing:  none ortho   DVT Exam:   No exam evidence to suggest DVT. Compartments soft and NT.           Radiology:   pending         Poncho Pinzon  11/7/2019  Office [phone] Dsax [phone]

## 2019-11-07 NOTE — PROGRESS NOTES
Discharge/Transition Planning     Problem: Discharge Planning  Goal: *Discharge to safe environment  Outcome: Progressing Towards Goal        Home    Patient uninsured, and will need low cost medications upon d/c.

## 2019-11-07 NOTE — PROGRESS NOTES
conducted an initial consultation and Spiritual Assessment for [employer], who is a 52 y.o.,male. Patients Primary Language is: [place]. According to the patients EMR Hoahaoism Affiliation is: No Congregation. The reason the Patient came to the hospital is:   Patient Active Problem List    Diagnosis Date Noted    Critical ischemia of upper extremity 11/06/2019    Pain of left upper extremity due to ischemia 11/06/2019        The  provided the following Interventions:  Initiated a relationship of care and support with patient in room [phone] today  Listened empathically as patient explained his illness and what has brought him to the hospital patient is very scared that he may not get the use of his arm back and talked about his futuere   Provided information about [employer]. Offered prayer and assurance of continued prayers on patients behalf. The following outcomes were achieved:  Patient shared limited information about his medical narrative. Patient processed feeling about current hospitalization. Patient expressed gratitude for pastoral care visit. Assessment:  Patient does not have any Judaism/cultural needs that will affect patients preferences in health care. There are no further spiritual or Judaism issues which require Spiritual Care Services interventions at this time. Plan:  Chaplains will continue to follow and will provide pastoral care on an as needed/requested basis    . Margarita Terrazas   Spiritual Care   [phone]

## 2019-11-07 NOTE — PROGRESS NOTES
Problem: Self Care Deficits Care Plan (Adult)  Goal: *Acute Goals and Plan of Care (Insert Text)  Description  Occupational Therapy Goals  Initiated 11/7/2019 within 7 day(s). 1.  Patient will perform bathing and upper body dressing with modified independence. 2.  Patient will participate in upper extremity therapeutic exercise/activities for LUE  (AAROM) with maximal assistance for 10 minutes. 3.  Patient will verbalize and comply with activities and positioning of LUE to maximize finger extension and promote reducing edema and functional movements. Prior Level of Function:  Pt was I with ADLs and IADLs. Outcome: Progressing Towards Goal    OCCUPATIONAL THERAPY EVALUATION    Patient: Tripp Burns (6 y.o. male)  Date: 11/7/2019  Primary Diagnosis: Pain of left upper extremity due to ischemia [I99.8]  Critical ischemia of upper extremity [I99.8]  Procedure(s) (LRB):  Embolectomy Of Left Upper Extremity With Angiogram (Left) Day of Surgery   Precautions:   Fall  PLOF: Pt was living with fiance and was I for ADLs and IADLs. ASSESSMENT :  Based on the objective data described below, the patient presents sitting in recliner s/p vascular surgery in LUE d/t ischemia. Pt LUE is positioned in splint. Nursing cleared patient for assessment and pt was agreeable. Pt reports no issues or concerns other than not being able to move or use LUE. Pt participated in AAROM exercises on table using washcloth and given active assistance from therapist.  Movements originated at shoulder for horizontal ab/adduction. Trace muscle movements noted with attempts for flexion/extension on washcloth using fingers. Educated patient regarding stretching fingers into extension throughout day and completing exercises for encouraging active muscle contractions in L hand and forearm. Skilled intervention is recommended to maximize functional movements and use of LUE and increase I for UB ADLs.     Patient will benefit from skilled intervention to address the above impairments. Patient's rehabilitation potential is considered to be Excellent  Factors which may influence rehabilitation potential include:   ? None noted  ? Mental ability/status  ? Medical condition  ? Home/family situation and support systems  ? Safety awareness  ? Pain tolerance/management  ? Other:      PLAN :  Recommendations and Planned Interventions:   ?               Self Care Training                  ? Therapeutic Activities  ? Functional Mobility Training   ? Cognitive Retraining  ? Therapeutic Exercises           ? Endurance Activities  ? Balance Training                    ? Neuromuscular Re-Education  ? Visual/Perceptual Training     ? Home Safety Training  ? Patient Education                   ? Family Training/Education  ? Other (comment):    Frequency/Duration: Patient will be followed by occupational therapy 3-5 times a week to address goals. Discharge Recommendations: Outpatient  Further Equipment Recommendations for Discharge: N/A     SUBJECTIVE:   Patient stated I can do everything except move this arm (L).     OBJECTIVE DATA SUMMARY:   History reviewed. No pertinent past medical history. History reviewed. No pertinent surgical history. Barriers to Learning/Limitations: None  Compensate with: visual, verbal, tactile, kinesthetic cues/model    Home Situation:   Home Situation  Home Environment: Private residence  # Steps to Enter: 1  One/Two Story Residence: One story  Living Alone: No  Support Systems: Spouse/Significant Other/Partner  Patient Expects to be Discharged to[de-identified] Private residence  Current DME Used/Available at Home: None  Tub or Shower Type: Tub/Shower combination  ? Right hand dominant   ?   Left hand dominant    Cognitive/Behavioral Status:  Neurologic State: Alert  Orientation Level: Oriented X4  Cognition: Appropriate decision making; Appropriate for age attention/concentration  Safety/Judgement: Awareness of environment(Good awareness of environment)    Skin: Well healing surgical wounds on L forearm  Edema: Mild L forearm and elbow    Vision/Perceptual:    Acuity: Within Defined Limits    Corrective Lenses: Glasses    Coordination: BUE  Fine Motor Skills-Upper: Right Intact; Left Impaired    Gross Motor Skills-Upper: Right Intact; Left Impaired    Balance:  Sitting: Intact  Standing: Intact    Strength: BUE  Strength: Grossly decreased, non-functional(RUE WFL; LUE 0/5)    Tone & Sensation: BUE  Tone: Abnormal(LUE flaccid)  Sensation: Impaired(report of numbness/tingling LUE)    Range of Motion: BUE  AROM: Grossly decreased, non-functional(RUE WFL; LUE (flaccid))    ADL Assessment:   Bathing: Minimum assistance    Upper Body Dressing: Moderate assistance;Minimum assistance    Lower Body Dressing: Modified independent    Toileting: Modified independent      ADL Intervention:  Pt reports difficulty donning and doffing UB clothing secondary to decreased function in LUE. Pt reports that girlfriend assists. Cognitive Retraining  Safety/Judgement: Awareness of environment(Good awareness of environment)    Therapeutic Exercise:  Pt participated in activated assisted ROM exercises for LUE with movements originating at shoulder for horz ab/ad. Pt was noted to have trace muscle movements with attempts to flex/ext fingers. Pt provided with rolled wash cloth to use of attempting finger flex/ext throughout day and instructed to use opposite hand for assistance, as well as using visualization of movements. Pain:  Pain level pre-treatment: 2/10 (\"discomfort from surgery)   Pain level post-treatment: 2/10   Pain Intervention(s): Medication (see MAR);  Rest, Ice, Repositioning   Response to intervention: Nurse notified, See doc flow    Activity Tolerance:   Pt tolerated activities well today. Please refer to the flowsheet for vital signs taken during this treatment. After treatment:   ? Patient left in no apparent distress sitting up in chair  ? Patient left in no apparent distress in bed  ? Call bell left within reach  ? Nursing notified  ? Caregiver present  ? Bed alarm activated    COMMUNICATION/EDUCATION:   ? Role of Occupational Therapy in the acute care setting  ? Home safety education was provided and the patient/caregiver indicated understanding. ? Patient/family have participated as able in goal setting and plan of care. ? Patient/family agree to work toward stated goals and plan of care. ? Patient understands intent and goals of therapy, but is neutral about his/her participation. ? Patient is unable to participate in goal setting and plan of care. Thank you for this referral.  Praveen Hollis OTR/L  Time Calculation: 24 mins    Eval Complexity: History: MEDIUM Complexity : Expanded review of history including physical, cognitive and psychosocial  history ; Examination: MEDIUM Complexity : 3-5 performance deficits relating to physical, cognitive , or psychosocial skils that result in activity limitations and / or participation restrictions; Decision Making:MEDIUM Complexity : Patient may present with comorbidities that affect occupational performnce.  Miniml to moderate modification of tasks or assistance (eg, physical or verbal ) with assesment(s) is necessary to enable patient to complete evaluation

## 2019-11-08 LAB
ALBUMIN SERPL-MCNC: 3.2 G/DL (ref 3.4–5)
ALBUMIN/GLOB SERPL: 0.8 {RATIO} (ref 0.8–1.7)
ALP SERPL-CCNC: 91 U/L (ref 45–117)
ALT SERPL-CCNC: 68 U/L (ref 16–61)
ANION GAP SERPL CALC-SCNC: 6 MMOL/L (ref 3–18)
AST SERPL-CCNC: 92 U/L (ref 10–38)
BASOPHILS # BLD: 0 K/UL (ref 0–0.1)
BASOPHILS NFR BLD: 1 % (ref 0–2)
BILIRUB DIRECT SERPL-MCNC: 0.2 MG/DL (ref 0–0.2)
BILIRUB SERPL-MCNC: 0.6 MG/DL (ref 0.2–1)
BUN SERPL-MCNC: 8 MG/DL (ref 7–18)
BUN/CREAT SERPL: 10 (ref 12–20)
CALCIUM SERPL-MCNC: 9.1 MG/DL (ref 8.5–10.1)
CHLORIDE SERPL-SCNC: 106 MMOL/L (ref 100–111)
CK SERPL-CCNC: 1131 U/L (ref 39–308)
CO2 SERPL-SCNC: 28 MMOL/L (ref 21–32)
CREAT SERPL-MCNC: 0.81 MG/DL (ref 0.6–1.3)
DIFFERENTIAL METHOD BLD: ABNORMAL
EOSINOPHIL # BLD: 0 K/UL (ref 0–0.4)
EOSINOPHIL NFR BLD: 0 % (ref 0–5)
ERYTHROCYTE [DISTWIDTH] IN BLOOD BY AUTOMATED COUNT: 13.5 % (ref 11.6–14.5)
GLOBULIN SER CALC-MCNC: 4 G/DL (ref 2–4)
GLUCOSE SERPL-MCNC: 89 MG/DL (ref 74–99)
HCT VFR BLD AUTO: 47.5 % (ref 36–48)
HGB BLD-MCNC: 16.1 G/DL (ref 13–16)
LYMPHOCYTES # BLD: 1.7 K/UL (ref 0.9–3.6)
LYMPHOCYTES NFR BLD: 46 % (ref 21–52)
MAGNESIUM SERPL-MCNC: 2 MG/DL (ref 1.6–2.6)
MCH RBC QN AUTO: 31.6 PG (ref 24–34)
MCHC RBC AUTO-ENTMCNC: 33.9 G/DL (ref 31–37)
MCV RBC AUTO: 93.1 FL (ref 74–97)
MONOCYTES # BLD: 0.5 K/UL (ref 0.05–1.2)
MONOCYTES NFR BLD: 14 % (ref 3–10)
NEUTS SEG # BLD: 1.4 K/UL (ref 1.8–8)
NEUTS SEG NFR BLD: 39 % (ref 40–73)
PHOSPHATE SERPL-MCNC: 3.3 MG/DL (ref 2.5–4.9)
PLATELET # BLD AUTO: 184 K/UL (ref 135–420)
PMV BLD AUTO: 11.1 FL (ref 9.2–11.8)
POTASSIUM SERPL-SCNC: 3.7 MMOL/L (ref 3.5–5.5)
PROT SERPL-MCNC: 7.2 G/DL (ref 6.4–8.2)
RBC # BLD AUTO: 5.1 M/UL (ref 4.7–5.5)
SODIUM SERPL-SCNC: 140 MMOL/L (ref 136–145)
WBC # BLD AUTO: 3.7 K/UL (ref 4.6–13.2)

## 2019-11-08 PROCEDURE — 84100 ASSAY OF PHOSPHORUS: CPT

## 2019-11-08 PROCEDURE — 74011250637 HC RX REV CODE- 250/637: Performed by: SURGERY

## 2019-11-08 PROCEDURE — 80076 HEPATIC FUNCTION PANEL: CPT

## 2019-11-08 PROCEDURE — 65270000029 HC RM PRIVATE

## 2019-11-08 PROCEDURE — 85025 COMPLETE CBC W/AUTO DIFF WBC: CPT

## 2019-11-08 PROCEDURE — 74011250636 HC RX REV CODE- 250/636: Performed by: SURGERY

## 2019-11-08 PROCEDURE — 74011250636 HC RX REV CODE- 250/636: Performed by: INTERNAL MEDICINE

## 2019-11-08 PROCEDURE — 82550 ASSAY OF CK (CPK): CPT

## 2019-11-08 PROCEDURE — 74011250637 HC RX REV CODE- 250/637: Performed by: INTERNAL MEDICINE

## 2019-11-08 PROCEDURE — 83735 ASSAY OF MAGNESIUM: CPT

## 2019-11-08 PROCEDURE — 97535 SELF CARE MNGMENT TRAINING: CPT

## 2019-11-08 PROCEDURE — 80048 BASIC METABOLIC PNL TOTAL CA: CPT

## 2019-11-08 PROCEDURE — 36415 COLL VENOUS BLD VENIPUNCTURE: CPT

## 2019-11-08 RX ORDER — ASPIRIN 325 MG/1
100 TABLET, FILM COATED ORAL DAILY
Status: DISCONTINUED | OUTPATIENT
Start: 2019-11-08 | End: 2019-11-09 | Stop reason: HOSPADM

## 2019-11-08 RX ORDER — OXYCODONE AND ACETAMINOPHEN 5; 325 MG/1; MG/1
1-2 TABLET ORAL
Qty: 40 TAB | Refills: 0 | Status: SHIPPED | OUTPATIENT
Start: 2019-11-08 | End: 2019-11-11

## 2019-11-08 RX ORDER — LISINOPRIL 10 MG/1
20 TABLET ORAL DAILY
Status: DISCONTINUED | OUTPATIENT
Start: 2019-11-08 | End: 2019-11-09 | Stop reason: HOSPADM

## 2019-11-08 RX ADMIN — CLOPIDOGREL BISULFATE 75 MG: 75 TABLET ORAL at 09:45

## 2019-11-08 RX ADMIN — Medication 10 ML: at 14:03

## 2019-11-08 RX ADMIN — DOCUSATE SODIUM 100 MG: 100 CAPSULE, LIQUID FILLED ORAL at 17:22

## 2019-11-08 RX ADMIN — Medication 10 ML: at 22:17

## 2019-11-08 RX ADMIN — DOCUSATE SODIUM 100 MG: 100 CAPSULE, LIQUID FILLED ORAL at 09:45

## 2019-11-08 RX ADMIN — HEPARIN SODIUM 5000 UNITS: 5000 INJECTION INTRAVENOUS; SUBCUTANEOUS at 03:11

## 2019-11-08 RX ADMIN — MULTIPLE VITAMINS W/ MINERALS TAB 1 TABLET: TAB at 09:45

## 2019-11-08 RX ADMIN — Medication 10 ML: at 05:47

## 2019-11-08 RX ADMIN — Medication 100 MG: at 12:21

## 2019-11-08 RX ADMIN — HEPARIN SODIUM 5000 UNITS: 5000 INJECTION INTRAVENOUS; SUBCUTANEOUS at 22:17

## 2019-11-08 RX ADMIN — DEXTROSE MONOHYDRATE, SODIUM CHLORIDE, AND POTASSIUM CHLORIDE 125 ML/HR: 50; 4.5; .745 INJECTION, SOLUTION INTRAVENOUS at 17:46

## 2019-11-08 RX ADMIN — HEPARIN SODIUM 5000 UNITS: 5000 INJECTION INTRAVENOUS; SUBCUTANEOUS at 12:21

## 2019-11-08 RX ADMIN — LISINOPRIL 20 MG: 10 TABLET ORAL at 12:21

## 2019-11-08 RX ADMIN — DEXTROSE MONOHYDRATE, SODIUM CHLORIDE, AND POTASSIUM CHLORIDE 125 ML/HR: 50; 4.5; .745 INJECTION, SOLUTION INTRAVENOUS at 00:50

## 2019-11-08 RX ADMIN — AMLODIPINE BESYLATE 10 MG: 10 TABLET ORAL at 09:45

## 2019-11-08 RX ADMIN — Medication 10 ML: at 16:27

## 2019-11-08 RX ADMIN — ASPIRIN 81 MG: 81 TABLET, COATED ORAL at 09:45

## 2019-11-08 RX ADMIN — Medication 10 ML: at 05:46

## 2019-11-08 NOTE — PROGRESS NOTES
1900  -- Bedside, Verbal and Written shift change report given to [id number] Radha Huang (oncoming nurse) by [address] [place] [address]). Allergy band placed on pt's wrist. Report included the following information SBAR, Kardex, Intake/Output, MAR and Recent Results.        2200 -- PM medications administered, pt tolerated with ease, will continue to monitor.     0000 -- Shift reassessment, pt condition unchanged, will continue to monitor.      0400 --  Shift reassessment, pt condition unchanged, will continue to monitor.          -- Bedside, Verbal and Written shift change report given to   (oncoming nurse) by MITCHELL (offgoing nurse). Report included the following information SBAR, Kardex, Intake/Output, MAR and Recent Results. Skin assessment completed.

## 2019-11-08 NOTE — PROGRESS NOTES
Problem: Self Care Deficits Care Plan (Adult)  Goal: *Acute Goals and Plan of Care (Insert Text)  Description  Occupational Therapy Goals  Initiated 11/7/2019 within 7 day(s). 1.  Patient will perform bathing and upper body dressing with modified independence. 2.  Patient will participate in upper extremity therapeutic exercise/activities for LUE  (AAROM) with maximal assistance for 10 minutes. 3.  Patient will verbalize and comply with activities and positioning of LUE to maximize finger extension and promote reducing edema and functional movements. Prior Level of Function:  Pt was I with ADLs and IADLs. Outcome: Progressing Towards Goal   OCCUPATIONAL THERAPY TREATMENT    Patient: Nany Burns (89 y.o. male)  Date: 11/8/2019  Diagnosis: Pain of left upper extremity due to ischemia [I99.8]  Critical ischemia of upper extremity [I99.8] <principal problem not specified>  Procedure(s) (LRB):  Embolectomy Of Left Upper Extremity With Angiogram (Left) 1 Day Post-Op  Precautions: Fall    Chart, occupational therapy assessment, plan of care, and goals were reviewed. ASSESSMENT:  Pt is pleasant and cooperative, motivated to participate w/therapy. Pt educated on SROM TherEx and importance of wrist support and positioning. Pt educated on compensatory strategies w/UB/LB dressing ADLs and ADL grooming tasks. Reviewed importance of donning sling OOB for joint protection. Pt left in chair with all needs within reach. Progression toward goals:  ?          Improving appropriately and progressing toward goals  ? Improving slowly and progressing toward goals  ? Not making progress toward goals and plan of care will be adjusted     PLAN:  Patient continues to benefit from skilled intervention to address the above impairments. Continue treatment per established plan of care.   Discharge Recommendations:  Outpatient  Further Equipment Recommendations for Discharge:  shower chair     SUBJECTIVE: Patient stated I've got to get back to work, and I need to be able to use my left hand.     OBJECTIVE DATA SUMMARY:   Cognitive/Behavioral Status:  Neurologic State: Alert  Orientation Level: Oriented X4  Cognition: Follows commands  Safety/Judgement: Awareness of environment(Good awareness of environment)    Functional Mobility and Transfers for ADLs:   Bed Mobility:  Supine to Sit: Modified independent   Transfers:  Sit to Stand: Modified independent  Bed to Chair: Modified independent  Balance:  Sitting: Intact  Standing: Intact    ADL Intervention:  Grooming  Position Performed: Seated edge of bed    Upper Body Dressing Assistance  Orthotics(Brace): Moderate assistance  Hospital Gown: Modified independent; Compensatory technique training    Lower Body Dressing Assistance  Socks: Modified independent; Compensatory technique training  Leg Crossed Method Used: Yes  Position Performed: Seated edge of bed  Cues: Don;Doff    UE Therapeutic Exercises:   SROM LUE shoulder flexion  SROM LUE elbow/wrist/digits flexion/extension    Pain:  Pain level pre-treatment: 0/10   Pain level post-treatment: 0/10    Activity Tolerance:    Good    Please refer to the flowsheet for vital signs taken during this treatment. After treatment:   ?  Patient left in no apparent distress sitting up in chair  ? Patient left in no apparent distress in bed  ? Call bell left within reach  ? Nursing notified  ? Caregiver present  ? Bed alarm activated    COMMUNICATION/EDUCATION:   ? Role of Occupational Therapy in the acute care setting  ? Home safety education was provided and the patient/caregiver indicated understanding. ? Patient/family have participated as able in working towards goals and plan of care. ? Patient/family agree to work toward stated goals and plan of care. ? Patient understands intent and goals of therapy, but is neutral about his/her participation. ?  Patient is unable to participate in goal setting and plan of care.      Thank you for this referral.  YOVANY Almonte  Time Calculation: 35 mins

## 2019-11-08 NOTE — PROGRESS NOTES
Progress Note        Assessment:    1. Flaccid paralysis LUE, unclear etiology. 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 PLAN:    1. MRI completed. Based on results there does to appear to be a structural orthopedic lesion. Recommendation is for neurology consult. Will continue to follow           HPI: Des Burns is a 52 y.o. male patient without new complaints status post  for Pain of left upper extremity due to ischemia [I99.8]  Critical ischemia of upper extremity [I99.8] 11/5/2019. No new orthopaedic changes. Blood pressure (!) 139/97, pulse 99, temperature 98 °F (36.7 °C), resp. rate 15, height 5' 11\" (1.803 m), weight 74.8 kg (165 lb), SpO2 100 %. CBC w/Diff   Lab Results   Component Value Date/Time    WBC 3.7 (L) 11/08/2019 06:45 AM    RBC 5.10 11/08/2019 06:45 AM    HCT 47.5 11/08/2019 06:45 AM          Physical Assessment:  General: in no apparent distress   Extremities:   EXTREMITIES:  LUE:  Mild edema, decreased sensation throughout all dermatomes in LUE. No motor activity in c5-t1. Compartments soft. +2 radial pulse and good cap refill. No masses or LAD. Shoulder/elbow/wrist/hand joints supple. Dressing:  none ortho   DVT Exam:   No exam evidence to suggest DVT. Compartments soft and NT.           Radiology:   pending         Alethea Valiente  11/8/2019  Office [phone]  Cell [phone]

## 2019-11-08 NOTE — PROGRESS NOTES
Problem: Falls - Risk of  Goal: *Absence of Falls  Description  Document Dennis Galarza Fall Risk and appropriate interventions in the flowsheet.   Outcome: Progressing Towards Goal  Note:   Fall Risk Interventions:            Medication Interventions: Patient to call before getting OOB, Evaluate medications/consider consulting pharmacy    Elimination Interventions: Call light in reach, Patient to call for help with toileting needs, Urinal in reach    History of Falls Interventions: Evaluate medications/consider consulting pharmacy

## 2019-11-08 NOTE — PROGRESS NOTES
0745 - Bedside shift change report given to Vignette (oncoming nurse) by Andrea Lyons (offgoing nurse). Report included the following information SBAR, Kardex, Procedure Summary, Intake/Output, MAR and Recent Results. 1000 - AM meds administered. Pt tolerated well. Pt was told to keep left arm elevated by MD. Pt stated he had flickering in his left pinky finger when he held it up, however Dr. Miguel Wilde explained that more tests needed to be done before pt is considered for discharge and that it is up to the surgeon. 1098 - Orthopedic surgeon requested to talk about pt. Explained to ortho surgeon that Dr. Miguel Wilde explained to pt that vascular surgeon (Dr. Lien Vasquez) would take a look at patient again to determine plan. 1722 - Pt asked to take of pulseOx while eating, however informed pt that he was ordered to have continuous pulseOx and could not remove it for his safety. 1915 - Bedside shift change report given to Andrea Lyons (oncoming nurse) by Jerilyn Rodriguez (offgoing nurse). Report included the following information SBAR, Kardex, Intake/Output, MAR and Recent Results.

## 2019-11-08 NOTE — PROGRESS NOTES
Problem: Falls - Risk of  Goal: *Absence of Falls  Description  Document Dennis Galarza Fall Risk and appropriate interventions in the flowsheet. Outcome: Progressing Towards Goal  Note:   Fall Risk Interventions:            Medication Interventions: Patient to call before getting OOB    Elimination Interventions: Call light in reach    History of Falls Interventions: Evaluate medications/consider consulting pharmacy         Problem: Patient Education: Go to Patient Education Activity  Goal: Patient/Family Education  Outcome: Progressing Towards Goal     Problem: Pressure Injury - Risk of  Goal: *Prevention of pressure injury  Description  Document Chinedu Scale and appropriate interventions in the flowsheet.   Outcome: Progressing Towards Goal  Note:   Pressure Injury Interventions:  Sensory Interventions: Assess changes in LOC         Activity Interventions: Pressure redistribution bed/mattress(bed type)    Mobility Interventions: Pressure redistribution bed/mattress (bed type)    Nutrition Interventions: Document food/fluid/supplement intake                     Problem: Patient Education: Go to Patient Education Activity  Goal: Patient/Family Education  Outcome: Progressing Towards Goal     Problem: Pain  Goal: *Control of Pain  Outcome: Progressing Towards Goal  Goal: *PALLIATIVE CARE:  Alleviation of Pain  Outcome: Progressing Towards Goal     Problem: Tissue Perfusion - Cardiopulmonary, Altered  Goal: *Optimize tissue perfusion  Outcome: Progressing Towards Goal  Goal: *Absence of hypoxia  Outcome: Progressing Towards Goal     Problem: Discharge Planning  Goal: *Discharge to safe environment  Outcome: Progressing Towards Goal     Problem: Patient Education: Go to Patient Education Activity  Goal: Patient/Family Education  Outcome: Progressing Towards Goal     Problem: Patient Education: Go to Patient Education Activity  Goal: Patient/Family Education  Outcome: Progressing Towards Goal

## 2019-11-08 NOTE — ROUTINE PROCESS
0800-Assessment completed, call bell within reach, no distress noted, voiced no complaints at this time. 0830-am due medications given. 1230-no change in condition, no distress noted, sitting up on chair. 1400-resting quietly in bed. 1630-no change in condition, no distress noted, voiced  No complaints at this time. 1930-Bedside and Verbal shift change report given to Sandra Huerta (oncoming nurse) by Humphrey Benton (offgoing nurse). Report included the following information SBAR, MAR and Recent Results.

## 2019-11-08 NOTE — PROGRESS NOTES
[facility]  -  Progress Note      Patient: Della Burns MRN: [id number]  SSN: [id number]    YOB: 1970  Age: 52 y.o. Sex: male      Admit Date: 11/5/2019  Patient seen and examined independently. Some movement of the left upper extremity today. Patient anxious to return home. Blood pressure remains elevated and patient was a started on ACE inhibitor today. Agree with assessment and plan as noted below. Neida Farrar MD  Assessment:     -Acute ischemia to LUE.  S/p embolectomy of left brachial artery, left radial artery, left ulnar artery by Dr. Elinda Bamberger 11/6/2019. [facility] surgery, orthopedics following.    -Elevated troponin 0.85 --> 1.10, associated with demand ischemia in setting of uncontrolled HTN and acute limb ischemia.  No symptoms to suggest ACS. - Echo 11/06/19 with EF 56-60%, grade 1 DD, dilated left atrium, moderate LV hypertrophy more prominent septal involvement compared to rest of the LV segment. Bubble study 11/7 without evidence of atrial septal defect. -HTN, uncontrolled.  Pt states has been told BP elevated in the past but has never taken medications for HTN. -Polysubstance use.  Pt reports ~16 pack-year smoking history, drinks 3-4 cans of beer/day, daily marijuana use, also recent cocaine use. Plan:     -Further management of L arm per ortho team, appreciate assistance.  -Bubble study yesterday without evidence of atrial septal defect.  -Continued on Amlodipine, starting Lisinopril this afternoon per hospitalist.    Subjective:     No new complaints. Reports mild improvement with LUE symptoms.     Objective:      Patient Vitals for the past 8 hrs:   Temp Pulse Resp BP SpO2   11/08/19 1235 97.7 °F (36.5 °C) 95 14 (!) 157/106 100 %   11/08/19 0805 97.8 °F (36.6 °C) 75 15 (!) 140/107 100 %         Patient Vitals for the past 96 hrs:   Weight   11/07/19 1144 165 lb (74.8 kg)   11/06/19 0958 165 lb (74.8 kg)   11/05/19 1914 150 lb (68 kg) Intake/Output Summary (Last 24 hours) at 11/8/2019 1414  Last data filed at 11/8/2019 0951  Gross per 24 hour   Intake 2815 ml   Output 3100 ml   Net -285 ml       Physical Exam:  General:  alert, cooperative, no distress, appears stated age  Neck:  supple  Lungs:  clear to auscultation bilaterally  Heart:  Regular rate and rhythm  Abdomen:  abdomen is soft without significant tenderness, masses, organomegaly or guarding  Extremities:  Atraumatic, no edema     Data Review:     Labs: Results:       Chemistry Recent Labs     11/08/19 0645 11/07/19 0700 11/06/19  0530 11/05/19  1849   GLU 89 86 91 113*    138 138 139   K 3.7 3.7 4.4 4.0    104 106 103   CO2 28 27 26 29   BUN 8 8 9 12   CREA 0.81 0.80 0.78 1.07   CA 9.1 9.1 9.2 9.4   MG 2.0 1.9  --   --    PHOS 3.3 2.2*  --   --    AGAP 6 7 6 7   BUCR 10* 10* 12 11*   AP 91 87  --  102   TP 7.2 7.1  --  8.2   ALB 3.2* 3.3*  --  4.0   GLOB 4.0 3.8  --  4.2*   AGRAT 0.8 0.9  --  1.0      CBC w/Diff Recent Labs     11/08/19 0645 11/07/19 0700 11/06/19  0530 11/05/19  1849   WBC 3.7* 7.3 7.1 9.8   RBC 5.10 4.80 4.80 4.77   HGB 16.1* 15.1 15.0 15.1   HCT 47.5 44.8 45.0 45.0    187 210 195   GRANS 39* 55  --  78*   LYMPH 46 32  --  12*   EOS 0 0  --  0      Cardiac Enzymes Lab Results   Component Value Date/Time    CPK 1,131 (H) 11/08/2019 06:45 AM      Coagulation Recent Labs     11/07/19  0700 11/05/19  2323   PTP 13.3  --    INR 1.0  --    APTT  --  24.5       Liver Enzymes Recent Labs     11/08/19 0645   TP 7.2   ALB 3.2*   AP 91   SGOT 92*      Thyroid Studies Lab Results   Component Value Date/Time    TSH 0.59 11/06/2019 05:30 AM

## 2019-11-08 NOTE — PROGRESS NOTES
Pt seen and examined. No events. Some improved LUE movement. Visit Vitals  BP (!) 157/106 (BP 1 Location: Right arm, BP Patient Position: Sitting)   Pulse 95   Temp 97.7 °F (36.5 °C)   Resp 14   Ht 5' 11\" (1.803 m)   Wt 165 lb (74.8 kg)   SpO2 100%   BMI 23.01 kg/m²     NAD  RRR  LUE forearm incision c/d/i with 2+ radial pulse    Awaiting ortho recommendations. Dispo planning.
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
## 2019-11-09 VITALS
WEIGHT: 165 LBS | SYSTOLIC BLOOD PRESSURE: 135 MMHG | TEMPERATURE: 98 F | BODY MASS INDEX: 23.1 KG/M2 | HEIGHT: 71 IN | RESPIRATION RATE: 16 BRPM | DIASTOLIC BLOOD PRESSURE: 98 MMHG | HEART RATE: 80 BPM | OXYGEN SATURATION: 100 %

## 2019-11-09 LAB — CK SERPL-CCNC: 708 U/L (ref 39–308)

## 2019-11-09 PROCEDURE — 82550 ASSAY OF CK (CPK): CPT

## 2019-11-09 PROCEDURE — 36415 COLL VENOUS BLD VENIPUNCTURE: CPT

## 2019-11-09 PROCEDURE — 74011250637 HC RX REV CODE- 250/637: Performed by: INTERNAL MEDICINE

## 2019-11-09 PROCEDURE — 74011250637 HC RX REV CODE- 250/637: Performed by: SURGERY

## 2019-11-09 PROCEDURE — 74011250636 HC RX REV CODE- 250/636: Performed by: SURGERY

## 2019-11-09 PROCEDURE — 74011250636 HC RX REV CODE- 250/636: Performed by: INTERNAL MEDICINE

## 2019-11-09 RX ORDER — AMLODIPINE BESYLATE 10 MG/1
10 TABLET ORAL DAILY
Qty: 30 TAB | Refills: 0 | Status: SHIPPED | OUTPATIENT
Start: 2019-11-10 | End: 2021-11-23

## 2019-11-09 RX ORDER — LISINOPRIL 20 MG/1
20 TABLET ORAL DAILY
Qty: 30 TAB | Refills: 0 | Status: SHIPPED | OUTPATIENT
Start: 2019-11-10 | End: 2021-11-23

## 2019-11-09 RX ADMIN — Medication 100 MG: at 08:34

## 2019-11-09 RX ADMIN — DOCUSATE SODIUM 100 MG: 100 CAPSULE, LIQUID FILLED ORAL at 08:34

## 2019-11-09 RX ADMIN — MULTIPLE VITAMINS W/ MINERALS TAB 1 TABLET: TAB at 08:33

## 2019-11-09 RX ADMIN — AMLODIPINE BESYLATE 10 MG: 10 TABLET ORAL at 08:33

## 2019-11-09 RX ADMIN — DEXTROSE MONOHYDRATE, SODIUM CHLORIDE, AND POTASSIUM CHLORIDE 125 ML/HR: 50; 4.5; .745 INJECTION, SOLUTION INTRAVENOUS at 01:29

## 2019-11-09 RX ADMIN — ASPIRIN 81 MG: 81 TABLET, COATED ORAL at 08:33

## 2019-11-09 RX ADMIN — LISINOPRIL 20 MG: 10 TABLET ORAL at 08:33

## 2019-11-09 RX ADMIN — HEPARIN SODIUM 5000 UNITS: 5000 INJECTION INTRAVENOUS; SUBCUTANEOUS at 03:55

## 2019-11-09 RX ADMIN — CLOPIDOGREL BISULFATE 75 MG: 75 TABLET ORAL at 08:34

## 2019-11-09 RX ADMIN — Medication 10 ML: at 05:37

## 2019-11-09 NOTE — PROGRESS NOTES
Progress Note          Assessment:    1. Flaccid paralysis LUE, unclear etiology.  Suspect brachial plexus traction injury vs radial nerve palsy. PLAN:    1. Ortho sign off. No evidence of structural orthopaedic lesion per MRI. Recommend neurology consult and outpatient EMG/NCS in 6 weeks to test for nerve recovery. HPI: Sriram Burns is a 52 y.o. Male who states he woke up after falling asleep in the chair 11/5/19 with inability to move his left arm. Patient also reported numbness and tingling that is mostly below the elbow. Found to have acute ischemia of LUE, underwent embolectomy of left brachial, radial and ulnar arteries. Hx of HTN, polysubstance abuse. Blood pressure (!) 135/98, pulse 80, temperature 98 °F (36.7 °C), resp. rate 16, height 5' 11\" (1.803 m), weight 74.8 kg (165 lb), SpO2 100 %.     CBC w/Diff   Lab Results   Component Value Date/Time    WBC 3.7 (L) 11/08/2019 06:45 AM    RBC 5.10 11/08/2019 06:45 AM    HCT 47.5 11/08/2019 06:45 AM                 - Ab Lunsford  11/9/2019  Office [phone]  Cell [phone]

## 2019-11-09 NOTE — DISCHARGE SUMMARY
vasc intact  Cleared by all services for DC  No need for vasc follow up  Needs outpt PT and Cards / Primary doc  Talked at length with patient and states will follow through on this  Prescribed meds recommended by med / cards  Has brace and person to drive him

## 2019-11-09 NOTE — DISCHARGE INSTRUCTIONS
Patient Education        High Blood Pressure: Care Instructions  Overview    It's normal for blood pressure to go up and down throughout the day. But if it stays up, you have high blood pressure. Another name for high blood pressure is hypertension. Despite what a lot of people think, high blood pressure usually doesn't cause headaches or make you feel dizzy or lightheaded. It usually has no symptoms. But it does increase your risk of stroke, heart attack, and other problems. You and your doctor will talk about your risks of these problems based on your blood pressure. Your doctor will give you a goal for your blood pressure. Your goal will be based on your health and your age. Lifestyle changes, such as eating healthy and being active, are always important to help lower blood pressure. You might also take medicine to reach your blood pressure goal.  Follow-up care is a key part of your treatment and safety. Be sure to make and go to all appointments, and call your doctor if you are having problems. It's also a good idea to know your test results and keep a list of the medicines you take. How can you care for yourself at home? Medical treatment  · If you stop taking your medicine, your blood pressure will go back up. You may take one or more types of medicine to lower your blood pressure. Be safe with medicines. Take your medicine exactly as prescribed. Call your doctor if you think you are having a problem with your medicine. · Talk to your doctor before you start taking aspirin every day. Aspirin can help certain people lower their risk of a heart attack or stroke. But taking aspirin isn't right for everyone, because it can cause serious bleeding. · See your doctor regularly. You may need to see the doctor more often at first or until your blood pressure comes down.   · If you are taking blood pressure medicine, talk to your doctor before you take decongestants or anti-inflammatory medicine, such as ibuprofen. Some of these medicines can raise blood pressure. · Learn how to check your blood pressure at home. Lifestyle changes  · Stay at a healthy weight. This is especially important if you put on weight around the waist. Losing even 10 pounds can help you lower your blood pressure. · If your doctor recommends it, get more exercise. Walking is a good choice. Bit by bit, increase the amount you walk every day. Try for at least 30 minutes on most days of the week. You also may want to swim, bike, or do other activities. · Avoid or limit alcohol. Talk to your doctor about whether you can drink any alcohol. · Try to limit how much sodium you eat to less than 2,300 milligrams (mg) a day. Your doctor may ask you to try to eat less than 1,500 mg a day. · Eat plenty of fruits (such as bananas and oranges), vegetables, legumes, whole grains, and low-fat dairy products. · Lower the amount of saturated fat in your diet. Saturated fat is found in animal products such as milk, cheese, and meat. Limiting these foods may help you lose weight and also lower your risk for heart disease. · Do not smoke. Smoking increases your risk for heart attack and stroke. If you need help quitting, talk to your doctor about stop-smoking programs and medicines. These can increase your chances of quitting for good. When should you call for help? Call  911 anytime you think you may need emergency care. This may mean having symptoms that suggest that your blood pressure is causing a serious heart or blood vessel problem. Your blood pressure may be over 180/120.   For example, call  911 if:    · You have symptoms of a heart attack. These may include:  ? Chest pain or pressure, or a strange feeling in the chest.  ? Sweating. ? Shortness of breath. ? Nausea or vomiting. ? Pain, pressure, or a strange feeling in the back, neck, jaw, or upper belly or in one or both shoulders or arms. ? Lightheadedness or sudden weakness.   ? A fast or irregular heartbeat.     · You have symptoms of a stroke. These may include:  ? Sudden numbness, tingling, weakness, or loss of movement in your face, arm, or leg, especially on only one side of your body. ? Sudden vision changes. ? Sudden trouble speaking. ? Sudden confusion or trouble understanding simple statements. ? Sudden problems with walking or balance. ? A sudden, severe headache that is different from past headaches.     · You have severe back or belly pain.    Do not wait until your blood pressure comes down on its own. Get help right away.   Call your doctor now or seek immediate care if:    · Your blood pressure is much higher than normal (such as 180/120 or higher), but you don't have symptoms.     · You think high blood pressure is causing symptoms, such as:  ? Severe headache.  ? Blurry vision.    Watch closely for changes in your health, and be sure to contact your doctor if:    · Your blood pressure measures higher than your doctor recommends at least 2 times. That means the top number is higher or the bottom number is higher, or both.     · You think you may be having side effects from your blood pressure medicine. Where can you learn more? Go to http://harjinder-gregory.info/. Enter M766 in the search box to learn more about \"High Blood Pressure: Care Instructions. \"  Current as of: April 9, 2019  Content Version: 12.2  © 4478-2738 [employer]. Care instructions adapted under license by [employer] (which disclaims liability or warranty for this information). If you have questions about a medical condition or this instruction, always ask your healthcare professional. [place] [address] any warranty or liability for your use of this information. Patient Education        Low Sodium Diet (2,000 Milligram): Care Instructions  Your Care Instructions    Too much sodium causes your body to hold on to extra water.  This can raise your blood pressure and force your heart and kidneys to work harder. In very serious cases, this could cause you to be put in the hospital. It might even be life-threatening. By limiting sodium, you will feel better and lower your risk of serious problems. The most common source of sodium is salt. People get most of the salt in their diet from canned, prepared, and packaged foods. Fast food and restaurant meals also are very high in sodium. Your doctor will probably limit your sodium to less than 2,000 milligrams (mg) a day. This limit counts all the sodium in prepared and packaged foods and any salt you add to your food. Follow-up care is a key part of your treatment and safety. Be sure to make and go to all appointments, and call your doctor if you are having problems. It's also a good idea to know your test results and keep a list of the medicines you take. How can you care for yourself at home? Read food labels  · Read labels on cans and food packages. The labels tell you how much sodium is in each serving. Make sure that you look at the serving size. If you eat more than the serving size, you have eaten more sodium. · Food labels also tell you the Percent Daily Value for sodium. Choose products with low Percent Daily Values for sodium. · Be aware that sodium can come in forms other than salt, including monosodium glutamate (MSG), sodium citrate, and sodium bicarbonate (baking soda). MSG is often added to Asian food. When you eat out, you can sometimes ask for food without MSG or added salt. Buy low-sodium foods  · Buy foods that are labeled \"unsalted\" (no salt added), \"sodium-free\" (less than 5 mg of sodium per serving), or \"low-sodium\" (less than 140 mg of sodium per serving). Foods labeled \"reduced-sodium\" and \"light sodium\" may still have too much sodium. Be sure to read the label to see how much sodium you are getting. · Buy fresh vegetables, or frozen vegetables without added sauces.  Buy low-sodium versions of canned vegetables, soups, and other canned goods. Prepare low-sodium meals  · Cut back on the amount of salt you use in cooking. This will help you adjust to the taste. Do not add salt after cooking. One teaspoon of salt has about 2,300 mg of sodium. · Take the salt shaker off the table. · Flavor your food with garlic, lemon juice, onion, vinegar, herbs, and spices. Do not use soy sauce, lite soy sauce, steak sauce, onion salt, garlic salt, celery salt, mustard, or ketchup on your food. · Use low-sodium salad dressings, sauces, and ketchup. Or make your own salad dressings and sauces without adding salt. · Use less salt (or none) when recipes call for it. You can often use half the salt a recipe calls for without losing flavor. Other foods such as rice, pasta, and grains do not need added salt. · Rinse canned vegetables, and cook them in fresh water. This removes some--but not all--of the salt. · Avoid water that is naturally high in sodium or that has been treated with water softeners, which add sodium. Call your local water company to find out the sodium content of your water supply. If you buy bottled water, read the label and choose a sodium-free brand. Avoid high-sodium foods  · Avoid eating:  ? Smoked, cured, salted, and canned meat, fish, and poultry. ? Ham, ramsey, hot dogs, and luncheon meats. ? Regular, hard, and processed cheese and regular peanut butter. ? Crackers with salted tops, and other salted snack foods such as pretzels, chips, and salted popcorn. ? Frozen prepared meals, unless labeled low-sodium. ? Canned and dried soups, broths, and bouillon, unless labeled sodium-free or low-sodium. ? Canned vegetables, unless labeled sodium-free or low-sodium. ? [place] fries, pizza, tacos, and other fast foods. ? Pickles, olives, ketchup, and other condiments, especially soy sauce, unless labeled sodium-free or low-sodium. Where can you learn more?   Go to http://harjinder-gregory.info/. Enter I989 in the search box to learn more about \"Low Sodium Diet (2,000 Milligram): Care Instructions. \"  Current as of: November 7, 2018  Content Version: 12.2  © 7472-3342 [employer]. Care instructions adapted under license by [employer] (which disclaims liability or warranty for this information). If you have questions about a medical condition or this instruction, always ask your healthcare professional. [address] any warranty or liability for your use of this information. Patient Education        Acute Pain After Surgery: Care Instructions  Your Care Instructions    It's common to have some pain after surgery. Pain doesn't mean that something is wrong or that the surgery didn't go well. But when the pain is severe, it's important to work with your doctor to manage it. It's also important to be aware of a few facts about pain and pain medicine. · You are the only person who knows what your pain feels like. So be sure to tell your doctor when you are in pain or when the pain changes. Then he or she will know how to adjust your medicines. · Pain is often easier to control right after it starts. So it may be better to take regular doses of pain medicine and not wait until the pain gets bad. · Medicine can help control pain. But this doesn't mean you'll have no pain. Medicine works to keep the pain at a level you can live with. With time, you will feel better. Follow-up care is a key part of your treatment and safety. Be sure to make and go to all appointments, and call your doctor if you are having problems. It's also a good idea to know your test results and keep a list of the medicines you take. How can you care for yourself at home? · Be safe with medicines. Read and follow all instructions on the label. ? If the doctor gave you a prescription medicine for pain, take it as prescribed.   ? If you are not taking a prescription pain medicine, ask your doctor if you can take an over-the-counter medicine. · If you take an over-the-counter pain medicine, such as acetaminophen (Tylenol), ibuprofen (Advil, Motrin), or naproxen (Aleve), read and follow all instructions on the label. · Do not take two or more pain medicines at the same time unless the doctor told you to. · Do not drink alcohol while you are taking pain medicines. · Try to walk each day if your doctor recommends it. Start by walking a little more than you did the day before. Bit by bit, increase the amount you walk. Walking increases blood flow. It also helps prevent pneumonia and constipation. · To prevent constipation from opioid pain medicines:  ? Talk to your doctor about a laxative. ? Include fruits, vegetables, beans, and whole grains in your diet each day. These foods are high in fiber. ? Drink plenty of fluids, enough so that your urine is light yellow or clear like water. Drink water, fruit juice, or other drinks that do not contain caffeine or alcohol. If you have kidney, heart, or liver disease and have to limit fluids, talk with your doctor before you increase the amount of fluids you drink. ? Take a fiber supplement, such as Citrucel or Metamucil, every day if needed. Read and follow all instructions on the label. If you take pain medicine for more than a few days, talk to your doctor before you take fiber. When should you call for help? Call your doctor now or seek immediate medical care if:    · Your pain gets worse.     · Your pain is not controlled by medicine.    Watch closely for changes in your health, and be sure to contact your doctor if you have any problems. Where can you learn more? Go to http://harjinder-gregory.info/. Enter [phone] [id number] in the search box to learn more about \"Acute Pain After Surgery: Care Instructions. \"  Current as of: June 26, 2019  Content Version: 12.2  © 6119-1123 Healthwise, Incorporated. Care instructions adapted under license by [employer] (which disclaims liability or warranty for this information). If you have questions about a medical condition or this instruction, always ask your healthcare professional. [address] any warranty or liability for your use of this information. Patient armband removed and shredded  DISCHARGE SUMMARY from Nurse    PATIENT INSTRUCTIONS:    After general anesthesia or intravenous sedation, for 24 hours or while taking prescription Narcotics:  · Limit your activities  · Do not drive and operate hazardous machinery  · Do not make important personal or business decisions  · Do  not drink alcoholic beverages  · If you have not urinated within 8 hours after discharge, please contact your surgeon on call. Report the following to your surgeon:  · Excessive pain, swelling, redness or odor of or around the surgical area  · Temperature over 100.5  · Nausea and vomiting lasting longer than 4 hours or if unable to take medications  · Any signs of decreased circulation or nerve impairment to extremity: change in color, persistent  numbness, tingling, coldness or increase pain  · Any questions    What to do at Home:  Recommended activity: Activity as tolerated,     If you experience any of the following symptoms worsening symptoms, please follow up with Ortho, Vascular. *  Please give a list of your current medications to your Primary Care Provider. *  Please update this list whenever your medications are discontinued, doses are      changed, or new medications (including over-the-counter products) are added. *  Please carry medication information at all times in case of emergency situations.     These are general instructions for a healthy lifestyle:    No smoking/ No tobacco products/ Avoid exposure to second hand smoke  Surgeon General's Warning:  Quitting smoking now greatly reduces serious risk to your health. Obesity, smoking, and sedentary lifestyle greatly increases your risk for illness    A healthy diet, regular physical exercise & weight monitoring are important for maintaining a healthy lifestyle    You may be retaining fluid if you have a history of heart failure or if you experience any of the following symptoms:  Weight gain of 3 pounds or more overnight or 5 pounds in a week, increased swelling in our hands or feet or shortness of breath while lying flat in bed. Please call your doctor as soon as you notice any of these symptoms; do not wait until your next office visit. The discharge information has been reviewed with the patient. The patient verbalized understanding. Discharge medications reviewed with the patient and appropriate educational materials and side effects teaching were provided.   ___________________________________________________________________________________________________________________________________

## 2019-11-09 NOTE — PROGRESS NOTES
[facility]  -  Progress Note      Patient: Isrrael Burns MRN: [id number]  SSN: [id number]    YOB: 1970  Age: 52 y.o. Sex: male      Admit Date: 11/5/2019    Assessment:     -Acute ischemia to LUE.  S/p embolectomy of left brachial artery, left radial artery, left ulnar artery by Dr. Edward James 11/6/2019. [facility] [place] [facility].    -Elevated troponin 0.85 --> 1.10, associated with demand ischemia in setting of uncontrolled HTN and acute limb ischemia.  No symptoms to suggest ACS. - Echo 11/06/19 with EF 56-60%, grade 1 DD, dilated left atrium, moderate LV hypertrophy more prominent septal involvement compared to rest of the LV segment. Bubble study 11/7 without evidence of atrial septal defect. -HTN, uncontrolled.  Pt states has been told BP elevated in the past but has never taken medications for HTN. -Polysubstance use.  Pt reports ~16 pack-year smoking history, drinks 3-4 cans of beer/day, daily marijuana use, also recent cocaine use. Plan:     -Further management of L arm per ortho team, vascular and neuro team, appreciate assistance.  -Echo without any obvious evidence of intracardiac shunting. Telemetry monitor did not show any arrhythmia  -Now on 2 antihypertensive medication. Continue same for now  -On dual antiplatelet agent per vascular recommendation    Subjective:     No new complaints. Reports mild improvement with LUE symptoms. Wants to go home.     Objective:      Patient Vitals for the past 8 hrs:   Temp Pulse Resp BP SpO2   11/09/19 0800 98.1 °F (36.7 °C) 76 16 (!) 136/98 100 %   11/09/19 0353 98 °F (36.7 °C) 74 18 (!) 138/99 100 %         Patient Vitals for the past 96 hrs:   Weight   11/07/19 1144 165 lb (74.8 kg)   11/06/19 0958 165 lb (74.8 kg)   11/05/19 1914 150 lb (68 kg)         Intake/Output Summary (Last 24 hours) at 11/9/2019 0931  Last data filed at 11/9/2019 0400  Gross per 24 hour   Intake 4601.66 ml   Output 2100 ml Net 2501.66 ml       Physical Exam:  General:  alert, cooperative, no distress, appears stated age  Neck:  supple  Lungs:  clear to auscultation bilaterally  Heart:  Regular rate and rhythm  Abdomen:  abdomen is soft without significant tenderness, masses, organomegaly or guarding  Extremities:  Atraumatic, no edema     Data Review:     Labs: Results:       Chemistry Recent Labs     11/08/19 0645 11/07/19  0700   GLU 89 86    138   K 3.7 3.7    104   CO2 28 27   BUN 8 8   CREA 0.81 0.80   CA 9.1 9.1   MG 2.0 1.9   PHOS 3.3 2.2*   AGAP 6 7   BUCR 10* 10*   AP 91 87   TP 7.2 7.1   ALB 3.2* 3.3*   GLOB 4.0 3.8   AGRAT 0.8 0.9      CBC w/Diff Recent Labs     11/08/19  0645 11/07/19  0700   WBC 3.7* 7.3   RBC 5.10 4.80   HGB 16.1* 15.1   HCT 47.5 44.8    187   GRANS 39* 55   LYMPH 46 32   EOS 0 0      Cardiac Enzymes Lab Results   Component Value Date/Time     (H) 11/09/2019 05:00 AM      Coagulation Recent Labs     11/07/19  0700   PTP 13.3   INR 1.0       Liver Enzymes Recent Labs     11/08/19  0645   TP 7.2   ALB 3.2*   AP 91   SGOT 92*      Thyroid Studies Lab Results   Component Value Date/Time    TSH 0.59 11/06/2019 05:30 AM

## 2019-11-09 NOTE — PROGRESS NOTES
Problem: Falls - Risk of  Goal: *Absence of Falls  Description  Document Marleny Siegel Fall Risk and appropriate interventions in the flowsheet.   Outcome: Progressing Towards Goal  Note:   Fall Risk Interventions:            Medication Interventions: Patient to call before getting OOB    Elimination Interventions: Call light in reach    History of Falls Interventions: Evaluate medications/consider consulting pharmacy
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
## 2019-11-09 NOTE — PROGRESS NOTES
1900  -- Bedside, Verbal and Written shift change report given to 2309 Radha Huang (oncoming nurse) by [address] [place] [address]). Allergy band placed on pt's wrist. Report included the following information SBAR, Kardex, Intake/Output, MAR and Recent Results.       2215 -- PM medications administered, pt tolerated with ease, will continue to monitor.     0000 -- Shift reassessment, pt condition unchanged, will continue to monitor.      0400 --  Shift reassessment, pt condition unchanged, will continue to monitor.          -- Bedside, Verbal and Written shift change report given to   (oncoming nurse) by MITCHELL (offgoing nurse). Report included the following information SBAR, Kardex, Intake/Output, MAR and Recent Results. Skin assessment completed.

## 2019-11-10 NOTE — ROUTINE PROCESS
0800-Assessment completed,call bell within reach, no distress noted, voiced no complaints at this time. 0900-am due medications given. 1230-no change in condition, no distress noted. 1500-discharge instructions given. Verbalized understanding. Discharged home via ambulation with girl friend.

## 2020-06-30 ENCOUNTER — OFFICE VISIT (OUTPATIENT)
Dept: INTERNAL MEDICINE CLINIC | Age: 50
End: 2020-06-30

## 2020-06-30 VITALS
HEART RATE: 89 BPM | OXYGEN SATURATION: 100 % | TEMPERATURE: 98.2 F | WEIGHT: 132 LBS | BODY MASS INDEX: 18.48 KG/M2 | SYSTOLIC BLOOD PRESSURE: 124 MMHG | HEIGHT: 71 IN | RESPIRATION RATE: 17 BRPM | DIASTOLIC BLOOD PRESSURE: 88 MMHG

## 2020-06-30 DIAGNOSIS — Z23 ENCOUNTER FOR IMMUNIZATION: ICD-10-CM

## 2020-06-30 DIAGNOSIS — Z13.1 SCREENING FOR DIABETES MELLITUS: ICD-10-CM

## 2020-06-30 DIAGNOSIS — M06.9 RHEUMATOID ARTHRITIS INVOLVING MULTIPLE SITES, UNSPECIFIED RHEUMATOID FACTOR PRESENCE: ICD-10-CM

## 2020-06-30 DIAGNOSIS — Z13.220 SCREENING FOR LIPID DISORDERS: ICD-10-CM

## 2020-06-30 DIAGNOSIS — Z13.21 ENCOUNTER FOR VITAMIN DEFICIENCY SCREENING: ICD-10-CM

## 2020-06-30 DIAGNOSIS — Z13.6 SCREENING FOR CARDIOVASCULAR, RESPIRATORY, AND GENITOURINARY DISEASES: ICD-10-CM

## 2020-06-30 DIAGNOSIS — Z13.29 SCREENING FOR THYROID DISORDER: ICD-10-CM

## 2020-06-30 DIAGNOSIS — I10 ESSENTIAL HYPERTENSION: Primary | ICD-10-CM

## 2020-06-30 DIAGNOSIS — Z13.0 SCREENING FOR DEFICIENCY ANEMIA: ICD-10-CM

## 2020-06-30 DIAGNOSIS — Z12.11 SCREENING FOR COLON CANCER: ICD-10-CM

## 2020-06-30 DIAGNOSIS — Z13.83 SCREENING FOR CARDIOVASCULAR, RESPIRATORY, AND GENITOURINARY DISEASES: ICD-10-CM

## 2020-06-30 DIAGNOSIS — Z13.89 SCREENING FOR CARDIOVASCULAR, RESPIRATORY, AND GENITOURINARY DISEASES: ICD-10-CM

## 2020-06-30 RX ORDER — OMEPRAZOLE 20 MG/1
CAPSULE, DELAYED RELEASE ORAL
COMMUNITY
Start: 2020-06-02 | End: 2021-11-23

## 2020-06-30 RX ORDER — METHYLPREDNISOLONE 4 MG/1
TABLET ORAL
Qty: 1 DOSE PACK | Refills: 0 | Status: SHIPPED | OUTPATIENT
Start: 2020-06-30 | End: 2021-11-23 | Stop reason: ALTCHOICE

## 2020-06-30 RX ORDER — AMLODIPINE BESYLATE 10 MG/1
10 TABLET ORAL DAILY
Qty: 30 TAB | Refills: 0 | Status: CANCELLED | OUTPATIENT
Start: 2020-06-30

## 2020-06-30 NOTE — PATIENT INSTRUCTIONS
Rheumatoid Arthritis: Care Instructions Your Care Instructions Arthritis is a common health problem in which the joints are inflamed. There are many types of arthritis. In rheumatoid arthritis, the body's own immune system attacks the joints. This causes pain, stiffness, and swelling in the joints, especially in the hands and feet. It can become hard to open jars, write, and do other daily tasks. Sometimes rheumatoid arthritis can also cause bumps to form under the skin. Over time, rheumatoid arthritis can damage and deform joints. Early treatment with medicines may reduce your chances of having a lasting disability. Follow-up care is a key part of your treatment and safety. Be sure to make and go to all appointments, and call your doctor if you are having problems. It's also a good idea to know your test results and keep a list of the medicines you take. How can you care for yourself at home? · If your doctor recommends it, get more exercise. Walking is a good choice. If your knees or ankles hurt, try riding a stationary bike or swimming. · Move each joint gently through its full range of motion once or twice a day. · Rest joints when they are sore or overworked. Short rest breaks may help more than staying in bed. · Reach and stay at a healthy weight. Regular exercise and a healthy diet will help you do this. Extra weight can strain the joints, especially the knees and hips, and make the pain worse. Losing even a few pounds may help. · Get enough calcium and vitamin D to help prevent osteoporosis, which causes thin bones. Talk to your doctor about how much you should take. · Protect your joints from injury. Do not overuse them. Try to limit or avoid activities that cause joint pain or swelling. Use special kitchen tools and other self-help devices as well as walkers, splints, or canes if needed. · Use heat to ease pain. Take warm showers or baths.  Use hot packs or a heating pad set on low. Sleep under a warm electric blanket. · Put ice or a cold pack on the area for 10 to 20 minutes at a time. Put a thin cloth between the ice and your skin. · Take pain medicines exactly as directed. ? If the doctor gave you a prescription medicine for pain, take it as prescribed. ? If you are not taking a prescription pain medicine, ask your doctor if you can take an over-the-counter medicine. · Take an active role in managing your condition. Set up a treatment plan with your doctor, and learn as much as you can about rheumatoid arthritis. This will help you control pain and stay active. When should you call for help? Call your doctor now or seek immediate medical care if: 
· You have a fever or a rash along with joint pain. · You have joint pain that is so severe that you cannot use the joint at all. · You have sudden swelling, redness, or pain in one or more joints, and you do not know why. · You have back or neck pain along with weakness in your arms or legs. · You have a loss of bowel or bladder control. Watch closely for changes in your health, and be sure to contact your doctor if: 
· You have joint pain that lasts for more than 6 weeks. · You have side effects from your arthritis medicines, such as stomach pain, nausea, heartburn, or dark and tarlike stools. Where can you learn more? Go to http://harjidner-gregory.info/ Enter K205 in the search box to learn more about \"Rheumatoid Arthritis: Care Instructions. \" Current as of: December 9, 2019               Content Version: 12.5 © 4337-2459 [employer]. Care instructions adapted under license by [employer] (which disclaims liability or warranty for this information). If you have questions about a medical condition or this instruction, always ask your healthcare professional. [place] [address] any warranty or liability for your use of this information.

## 2020-06-30 NOTE — PROGRESS NOTES
ROOM # 2  Identified pt with two pt identifiers(name and ). Reviewed record in preparation for visit and have obtained necessary documentation. Chief Complaint   Patient presents with   Hernandez Burns preferred language for health care discussion is english/other. Is the patient using any DME equipment during OV? NO    Hema Copea Grant is due for:  Health Maintenance Due   Topic    Pneumococcal 0-64 years (1 of 1 - PPSV23)    DTaP/Tdap/Td series (1 - Tdap)    Lipid Screen     Shingrix Vaccine Age 50> (1 of 2)    FOBT Q1Y Age 54-65      Health Maintenance reviewed and discussed per provider  Please order/place referral if appropriate. Advance Directive:  1. Do you have an advance directive in place? Patient Reply: NO    2. If not, would you like material regarding how to put one in place? NO    Coordination of Care:  1. Have you been to the ER, urgent care clinic since your last visit? Hospitalized since your last visit? NO    2. Have you seen or consulted any other health care providers outside of the [address] [place] [address] since your last visit? Include any pap smears or colon screening. NO    Patient is accompanied by friend I have received verbal consent from Hema Burns to discuss any/all medical information while they are present in the room.     Learning Assessment:  n/i  Depression Screening:  3 most recent PHQ Screens 2020   Little interest or pleasure in doing things Not at all   Feeling down, depressed, irritable, or hopeless Not at all   Total Score PHQ 2 0     Abuse Screening:  n/i  Fall Risk  n/i

## 2020-06-30 NOTE — PROGRESS NOTES
HISTORY OF PRESENT ILLNESS  Carmen Burns is a 48 y.o. male. Here today to establish care   Establish Care   The history is provided by the patient. This is a new problem. The current episode started more than 1 week ago. Pertinent negatives include no chest pain, no abdominal pain, no headaches and no shortness of breath. Hypertension    The history is provided by the patient. This is a chronic problem. The current episode started more than 1 week ago. The problem has not changed since onset. Pertinent negatives include no chest pain, no palpitations, no malaise/fatigue, no blurred vision, no headaches, no dizziness, no shortness of breath, no nausea and no vomiting. There are no associated agents to hypertension. Risk factors include family history, smoking/tobacco exposure and male gender. /88 (BP 1 Location: Right arm, BP Patient Position: Sitting)   Pulse 89   Temp 98.2 °F (36.8 °C) (Oral)   Resp 17   Ht 5' 11\" (1.803 m)   Wt 132 lb (59.9 kg)   SpO2 100%   BMI 18.41 kg/m²      Current Outpatient Medications   Medication Sig Dispense Refill    omeprazole (PRILOSEC) 20 mg capsule       methylPREDNISolone (MEDROL DOSEPACK) 4 mg tablet Per dose pack 1 Dose Pack 0    amLODIPine (NORVASC) 10 mg tablet Take 1 Tab by mouth daily. 30 Tab 0    lisinopril (PRINIVIL, ZESTRIL) 20 mg tablet Take 1 Tab by mouth daily. 30 Tab 0     Past Medical History:   Diagnosis Date    Arthritis     rheumatoid     Hypertension        Review of Systems   Constitutional: Negative for chills, fever and malaise/fatigue. Eyes: Negative for blurred vision and double vision. Respiratory: Negative for cough, shortness of breath and wheezing. Cardiovascular: Negative for chest pain, palpitations and leg swelling. Gastrointestinal: Negative for abdominal pain, nausea and vomiting. Genitourinary: Positive for frequency. Negative for urgency. Musculoskeletal: Positive for joint pain (multiple ).    Skin: Negative for itching and rash. Neurological: Negative for dizziness and headaches. Endo/Heme/Allergies: Negative for polydipsia. Psychiatric/Behavioral: Negative for depression. The patient does not have insomnia. Physical Exam  Vitals signs and nursing note reviewed. Constitutional:       General: He is not in acute distress. Appearance: Normal appearance. He is not ill-appearing. HENT:      Head: Normocephalic. Right Ear: Tympanic membrane, ear canal and external ear normal.      Left Ear: Tympanic membrane, ear canal and external ear normal.      Nose: Nose normal.      Mouth/Throat:      Mouth: Mucous membranes are moist.      Pharynx: Oropharynx is clear. Eyes:      General: No scleral icterus. Extraocular Movements: Extraocular movements intact. Conjunctiva/sclera: Conjunctivae normal.      Pupils: Pupils are equal, round, and reactive to light. Cardiovascular:      Rate and Rhythm: Normal rate and regular rhythm. Pulses: Normal pulses. Heart sounds: Normal heart sounds. Pulmonary:      Effort: Pulmonary effort is normal. No respiratory distress. Breath sounds: Normal breath sounds. No wheezing. Abdominal:      General: Abdomen is flat. Bowel sounds are normal.      Palpations: Abdomen is soft. There is no mass. Tenderness: There is no abdominal tenderness. There is no guarding or rebound. Musculoskeletal: Normal range of motion. Arms:       Right lower leg: No edema. Left lower leg: No edema. Lymphadenopathy:      Cervical: No cervical adenopathy. Skin:     General: Skin is warm and dry. Findings: No lesion or rash. Neurological:      General: No focal deficit present. Mental Status: He is alert and oriented to person, place, and time. Psychiatric:         Mood and Affect: Mood normal.         Behavior: Behavior normal.         Thought Content:  Thought content normal.         Judgment: Judgment normal. ASSESSMENT and PLAN  Diagnoses and all orders for this visit:      2. Essential hypertension    - BP controlled today- Patient will call office to clarify which medications for BP he actually takes so we can update our system. 3. Rheumatoid arthritis involving multiple sites, unspecified rheumatoid factor presence ([facility] [address]  -     REFERRAL TO RHEUMATOLOGY- patient reports HX of rheumatoid arthritis   -     RHEUMATOID FACTOR, QT; Future  -     methylPREDNISolone (MEDROL DOSEPACK) 4 mg tablet; Per dose pack    4. Encounter for immunization  -     ZOSTER VACC RECOMBINANT ADJUVANTED- patient advised he will need to come and get his second vaccine in 2-6 months   -     TETANUS, DIPHTHERIA TOXOIDS AND ACELLULAR PERTUSSIS VACCINE (TDAP), IN INDIVIDS. >=7, IM  -     PNEUMOCOCCAL POLYSACCHARIDE VACCINE, 23-VALENT, ADULT OR IMMUNOSUPPRESSED PT DOSE,    5. Screening for colon cancer  -     OCCULT BLOOD IMMUNOASSAY,DIAGNOSTIC; Future  -     REFERRAL TO GASTROENTEROLOGY- for colonoscopy     6. Screening for cardiovascular, respiratory, and genitourinary diseases  -     METABOLIC PANEL, COMPREHENSIVE; Future  -     URINALYSIS W/ RFLX MICROSCOPIC; Future    7. Screening for diabetes mellitus  -     HEMOGLOBIN A1C WITH EAG; Future    8. Encounter for vitamin deficiency screening  -     VITAMIN D, 25 HYDROXY; Future    9. Screening for deficiency anemia  -     CBC WITH AUTOMATED DIFF; Future    10. Screening for thyroid disorder  -     TSH AND FREE T4; Future    11. Screening for lipid disorders  -     LIPID PANEL; Future      Follow-up and Dispositions    · Return in about 2 months (around 8/30/2020) for HTN.

## 2020-07-01 DIAGNOSIS — E55.9 VITAMIN D DEFICIENCY: Primary | ICD-10-CM

## 2020-07-01 LAB
25(OH)D3 SERPL-MCNC: 12.6 NG/ML (ref 32–100)
A-G RATIO,AGRAT: 1.8 RATIO (ref 1.1–2.6)
ABSOLUTE LYMPHOCYTE COUNT, 10803: 2.5 K/UL (ref 1–4.8)
ALBUMIN SERPL-MCNC: 5 G/DL (ref 3.5–5)
ALP SERPL-CCNC: 66 U/L (ref 25–115)
ALT SERPL-CCNC: 23 U/L (ref 5–40)
ANION GAP SERPL CALC-SCNC: 14 MMOL/L
AST SERPL W P-5'-P-CCNC: 25 U/L (ref 10–37)
AVG GLU, 10930: 109 MG/DL (ref 91–123)
BASOPHILS # BLD: 0 K/UL (ref 0–0.2)
BASOPHILS NFR BLD: 0 % (ref 0–2)
BILIRUB SERPL-MCNC: 0.4 MG/DL (ref 0.2–1.2)
BILIRUB UR QL: NEGATIVE
BUN SERPL-MCNC: 15 MG/DL (ref 6–22)
CALCIUM SERPL-MCNC: 9.6 MG/DL (ref 8.4–10.5)
CHLORIDE SERPL-SCNC: 96 MMOL/L (ref 98–110)
CHOLEST SERPL-MCNC: 198 MG/DL (ref 110–200)
CO2 SERPL-SCNC: 25 MMOL/L (ref 20–32)
CREAT SERPL-MCNC: 0.8 MG/DL (ref 0.5–1.2)
EOSINOPHIL # BLD: 0.1 K/UL (ref 0–0.5)
EOSINOPHIL NFR BLD: 2 % (ref 0–6)
ERYTHROCYTE [DISTWIDTH] IN BLOOD BY AUTOMATED COUNT: 14.2 % (ref 10–15.5)
GFRAA, 66117: >60
GFRNA, 66118: >60
GLOBULIN,GLOB: 2.8 G/DL (ref 2–4)
GLUCOSE SERPL-MCNC: 62 MG/DL (ref 70–99)
GLUCOSE UR QL: NEGATIVE MG/DL
GRANULOCYTES,GRANS: 54 % (ref 40–75)
HBA1C MFR BLD HPLC: 5.4 % (ref 4.8–5.6)
HCT VFR BLD AUTO: 40.2 % (ref 39.3–51.6)
HDLC SERPL-MCNC: 3.5 MG/DL (ref 0–5)
HDLC SERPL-MCNC: 57 MG/DL
HGB BLD-MCNC: 12.8 G/DL (ref 13.1–17.2)
HGB UR QL STRIP: NEGATIVE
KETONES UR QL STRIP.AUTO: NEGATIVE MG/DL
LDL/HDL RATIO,LDHD: 2.1
LDLC SERPL CALC-MCNC: 120 MG/DL (ref 50–99)
LEUKOCYTE ESTERASE: NEGATIVE
LYMPHOCYTES, LYMLT: 32 % (ref 20–45)
MCH RBC QN AUTO: 28 PG (ref 26–34)
MCHC RBC AUTO-ENTMCNC: 32 G/DL (ref 31–36)
MCV RBC AUTO: 88 FL (ref 80–95)
MONOCYTES # BLD: 0.9 K/UL (ref 0.1–1)
MONOCYTES NFR BLD: 12 % (ref 3–12)
NEUTROPHILS # BLD AUTO: 4.2 K/UL (ref 1.8–7.7)
NITRITE UR QL STRIP.AUTO: NEGATIVE
NON-HDL CHOLESTEROL, 011976: 141 MG/DL
PH UR STRIP: 6 PH (ref 5–8)
PLATELET # BLD AUTO: 419 K/UL (ref 140–440)
PMV BLD AUTO: 10.3 FL (ref 9–13)
POTASSIUM SERPL-SCNC: 4.5 MMOL/L (ref 3.5–5.5)
PROT SERPL-MCNC: 7.8 G/DL (ref 6.4–8.3)
PROT UR QL STRIP: NEGATIVE MG/DL
RBC # BLD AUTO: 4.55 M/UL (ref 3.8–5.8)
RBC #/AREA URNS HPF: ABNORMAL /HPF
SODIUM SERPL-SCNC: 135 MMOL/L (ref 133–145)
SP GR UR: 1.02 (ref 1–1.03)
T4 FREE SERPL-MCNC: 1.2 NG/DL (ref 0.9–1.8)
TRIGL SERPL-MCNC: 104 MG/DL (ref 40–149)
TSH SERPL DL<=0.005 MIU/L-ACNC: 0.9 MCU/ML (ref 0.27–4.2)
UROBILINOGEN UR STRIP-MCNC: 2 MG/DL
VLDLC SERPL CALC-MCNC: 21 MG/DL (ref 8–30)
WBC # BLD AUTO: 7.8 K/UL (ref 4–11)
WBC URNS QL MICRO: ABNORMAL /HPF (ref 0–2)

## 2020-07-01 RX ORDER — ERGOCALCIFEROL 1.25 MG/1
50000 CAPSULE ORAL
Qty: 8 CAP | Refills: 0 | Status: SHIPPED | OUTPATIENT
Start: 2020-07-01 | End: 2020-08-20

## 2020-07-01 NOTE — PROGRESS NOTES
Carmen Burns is a 48 y.o. male who presents for routine immunizations. He denies any symptoms , reactions or allergies that would exclude them from being immunized today. Risks and adverse reactions were discussed and the VIS was given to them. All questions were addressed. He was observed for 20 min post injection. There were no reactions observed.     Shantelle Greenberg

## 2020-07-01 NOTE — PROGRESS NOTES
Results reviewed. Please call patient with results his vitamin D is very low at 12.6 and should be  this can cause joint pain. I will send a prescription of vitamin D 50,000 I U to take one capsule weekly for 8 weeks and to come back to the office for recheck of vitamin D once that is finished. Patient will also need to spend 15 mins in the sun daily for better absorption and add more calcium to his diet. His cholesterol is elevated and will need to work on diet and exercise. All other labs are within acceptable ranges.

## 2020-07-02 ENCOUNTER — HOSPITAL ENCOUNTER (OUTPATIENT)
Dept: LAB | Age: 50
Discharge: HOME | End: 2020-07-02

## 2020-07-02 LAB — SENTARA SPECIMEN COL,SENBCF: NORMAL

## 2020-07-02 PROCEDURE — 99001 SPECIMEN HANDLING PT-LAB: CPT

## 2020-07-02 NOTE — PROGRESS NOTES
Patient phone is not accepting calls at this time. Letter was mailed to address on file requesting patient to call the office.

## 2020-07-03 LAB — HEMOCCULT STL QL IA: NEGATIVE

## 2021-11-23 ENCOUNTER — OFFICE VISIT (OUTPATIENT)
Dept: INTERNAL MEDICINE CLINIC | Age: 51
End: 2021-11-23
Payer: MEDICAID

## 2021-11-23 VITALS
HEART RATE: 79 BPM | WEIGHT: 143.4 LBS | SYSTOLIC BLOOD PRESSURE: 123 MMHG | BODY MASS INDEX: 20.08 KG/M2 | DIASTOLIC BLOOD PRESSURE: 77 MMHG | OXYGEN SATURATION: 100 % | TEMPERATURE: 96.8 F | RESPIRATION RATE: 18 BRPM | HEIGHT: 71 IN

## 2021-11-23 DIAGNOSIS — R51.9 SINUS HEADACHE: Primary | ICD-10-CM

## 2021-11-23 DIAGNOSIS — Z12.11 ENCOUNTER FOR SCREENING COLONOSCOPY: ICD-10-CM

## 2021-11-23 DIAGNOSIS — I10 ESSENTIAL HYPERTENSION: ICD-10-CM

## 2021-11-23 PROCEDURE — 99213 OFFICE O/P EST LOW 20 MIN: CPT | Performed by: NURSE PRACTITIONER

## 2021-11-23 RX ORDER — CETIRIZINE HCL 10 MG
10 TABLET ORAL
Qty: 30 TABLET | Refills: 2 | Status: SHIPPED | OUTPATIENT
Start: 2021-11-23

## 2021-11-23 NOTE — PROGRESS NOTES
ROOM # 1  Identified pt with two pt identifiers(name and ). Reviewed record in preparation for visit and have obtained necessary documentation. Chief Complaint   Patient presents with    Hypertension      Heidi Burns preferred language for health care discussion is english/other. Is the patient using any DME equipment during OV? Ilia Burns is due for:  Health Maintenance Due   Topic    Hepatitis C Screening     COVID-19 Vaccine (1)    Shingrix Vaccine Age 50> (2 of 2)    Colorectal Cancer Screening Combo     Flu Vaccine (1)     Health Maintenance reviewed and discussed per provider  Please order/place referral if appropriate. 1. For patients aged 39-70: Has the patient had a colonoscopy? No   If the patient is female:    2. For patients aged 41-77: Has the patient had a mammogram within the past 2 years? NA based on age or sex    1. For patients aged 21-65: Has the patient had a pap smear? NA based on age or sex  Advance Directive:  1. Do you have an advance directive in place? Patient Reply: NO    2. If not, would you like material regarding how to put one in place? NO    Coordination of Care:  1. Have you been to the ER, urgent care clinic since your last visit? Hospitalized since your last visit? NO    2. Have you seen or consulted any other health care providers outside of the [address] [place] [address] since your last visit? Include any pap smears or colon screening. NO    Patient is accompanied by self I have received verbal consent from Heidi Burns to discuss any/all medical information while they are present in the room.     Depression Screening:  3 most recent PHQ Screens 2021   Little interest or pleasure in doing things Not at all Not at all   Feeling down, depressed, irritable, or hopeless Not at all Not at all   Total Score PHQ 2 0 0       Recent Travel Screening and Travel History documentation     Travel Screening     No screening recorded since 11/22/21 0000     Travel History   Travel since 10/23/21    No documented travel since 10/23/21

## 2021-11-23 NOTE — PROGRESS NOTES
HISTORY OF PRESENT ILLNESS  Karmen Burns is a 46 y.o. male. Here for HTN management, Has not been seen since 07/01/2020  HPI  1) HTN - not on medications   BP Readings from Last 3 Encounters:   11/23/21 123/77   06/30/20 124/88   11/09/19 (!) 135/98     2) Headaches frontal 2-3 times a month - lasts a day - goes away when he goes asleep - has not tried anything for this. Sharp pain in head - began a long time ago - also has blurry vision with headaches- does work around dust and inhalant chemicals. /77 (BP 1 Location: Right arm)   Pulse 79   Temp 96.8 °F (36 °C) (Temporal)   Resp 18   Ht 5' 11\" (1.803 m)   Wt 143 lb 6.4 oz (65 kg)   SpO2 100%   BMI 20.00 kg/m²   Current Outpatient Medications   Medication Sig Dispense Refill    cetirizine (ZYRTEC) 10 mg tablet Take 1 Tablet by mouth daily as needed for Allergies. 30 Tablet 2       . Review of Systems   Constitutional: Negative for chills, fever and malaise/fatigue. Eyes: Negative for blurred vision and double vision. Respiratory: Negative for cough, shortness of breath and wheezing. Cardiovascular: Negative for chest pain, palpitations and leg swelling. Neurological: Positive for headaches. Negative for dizziness. Physical Exam  Vitals and nursing note reviewed. Constitutional:       General: He is not in acute distress. Appearance: Normal appearance. He is not ill-appearing. HENT:      Head: Normocephalic. Right Ear: External ear normal.      Left Ear: External ear normal.      Mouth/Throat:      Comments: MASK  Eyes:      General: No scleral icterus. Extraocular Movements: Extraocular movements intact. Conjunctiva/sclera: Conjunctivae normal.      Pupils: Pupils are equal, round, and reactive to light. Cardiovascular:      Rate and Rhythm: Normal rate and regular rhythm. Pulses: Normal pulses. Heart sounds: Normal heart sounds.    Pulmonary:      Effort: Pulmonary effort is normal. No respiratory distress. Breath sounds: Normal breath sounds. No wheezing. Musculoskeletal:         General: Normal range of motion. Cervical back: Normal range of motion. Right lower leg: No edema. Left lower leg: No edema. Lymphadenopathy:      Cervical: No cervical adenopathy. Skin:     General: Skin is warm and dry. Findings: No lesion or rash. Neurological:      General: No focal deficit present. Mental Status: He is alert and oriented to person, place, and time. Mental status is at baseline. Psychiatric:         Mood and Affect: Mood normal.         Behavior: Behavior normal.         Thought Content: Thought content normal.         Judgment: Judgment normal.         ASSESSMENT and PLAN  Diagnoses and all orders for this visit:    1. Sinus headache  -     cetirizine (ZYRTEC) 10 mg tablet; Take 1 Tablet by mouth daily as needed for Allergies. 2. Essential hypertension    3. Encounter for screening colonoscopy  -     REFERRAL TO GASTROENTEROLOGY      Excedrin if zyrtec not effective ? Sinus vs tension headache   Check BP BID and bring log to next visit BP controlled today   Quit smoking     Follow-up and Dispositions    · Return in about 2 months (around 1/23/2022).

## (undated) DEVICE — REM POLYHESIVE ADULT PATIENT RETURN ELECTRODE: Brand: VALLEYLAB

## (undated) DEVICE — INTENDED FOR TISSUE SEPARATION, AND OTHER PROCEDURES THAT REQUIRE A SHARP SURGICAL BLADE TO PUNCTURE OR CUT.: Brand: BARD-PARKER SAFETY BLADES SIZE 11, STERILE

## (undated) DEVICE — TABLE COVER: Brand: CONVERTORS

## (undated) DEVICE — (D)PREP SKN CHLRAPRP APPL 26ML -- CONVERT TO ITEM 371833

## (undated) DEVICE — SUTURE PROL SZ 5-0 L24IN NONABSORBABLE BLU L17MM RB-1 1/2 8555H

## (undated) DEVICE — FOGARTY ARTERIAL EMBOLECTOMY CATHETER 4F 40CM: Brand: FOGARTY

## (undated) DEVICE — INTRO SHTH AVNT + 4FRX11CM -- AVANTI+ - ORDER AS EA

## (undated) DEVICE — KIT CLN UP BON SECOURS MARYV

## (undated) DEVICE — SUTURE VCRL SZ 3-0 L27IN ABSRB UD L26MM SH 1/2 CIR J416H

## (undated) DEVICE — SUTURE COAT VCRL SZ 4-0 L18IN ABSRB UD L19MM PS-2 1/2 CIR J496G

## (undated) DEVICE — SUT PROL 6-0 30IN C1 DA BLU --

## (undated) DEVICE — SUTURE PERMA-HAND SZ 3-0 L24IN NONABSORBABLE BLK W/O NDL SA74H

## (undated) DEVICE — NEEDLE ANGIO 1 WALL ULT SMOOTH 19GAX7CM MERIT AVANCE

## (undated) DEVICE — SUTURE PERMA-HAND SZ 2-0 L24IN NONABSORBABLE BLK W/O NDL SA75H

## (undated) DEVICE — SYRINGE MED 20ML STD CLR PLAS LUERLOCK TIP N CTRL DISP

## (undated) DEVICE — Device

## (undated) DEVICE — SOLUTION IV 1000ML 0.9% SOD CHL

## (undated) DEVICE — FLEX ADVANTAGE 3000CC: Brand: FLEX ADVANTAGE

## (undated) DEVICE — ADHESIVE TISS DERMA FLEX 0.7ML -- HIGH VISCOSITY

## (undated) DEVICE — 48" PROBE COVER W/GEL, ULTRASOUND, STERILE: Brand: SITE-RITE

## (undated) DEVICE — SUTURE PROL SZ 5-0 L24IN NONABSORBABLE BLU L9.3MM BV-1 3/8 9702H

## (undated) DEVICE — GLOVE SURG BIOGEL 8.0 STRL -- SKINSENSE

## (undated) DEVICE — SUTURE MCRYL SZ 4-0 L18IN ABSRB UD L19MM PS-2 3/8 CIR PRIM Y496G

## (undated) DEVICE — MICROPUNCTURE INTRODUCER SET SILHOUETTE TRANSITIONLESS WITH STAINLESS STEEL WIRE GUIDE: Brand: MICROPUNCTURE

## (undated) DEVICE — APPLIER CLP AUTO MED 9.75 IN TI SURGCLP SUPER INTLOK 20 DISP

## (undated) DEVICE — TUBING, SUCTION, 1/4" X 20', STRAIGHT: Brand: MEDLINE INDUSTRIES, INC.

## (undated) DEVICE — SUTURE MCRYL 3-0 L27IN ABSRB VLT SH L26MM 1/2 CIR Y316H

## (undated) DEVICE — GLOVE SURG SZ 7.5 L11.73IN FNGR THK9.8MIL STRW LTX POLYMER

## (undated) DEVICE — FOGARTY ARTERIAL EMBOLECTOMY CATHETER 3F 40CM: Brand: FOGARTY